# Patient Record
Sex: FEMALE | Race: WHITE | NOT HISPANIC OR LATINO | Employment: UNEMPLOYED | URBAN - METROPOLITAN AREA
[De-identification: names, ages, dates, MRNs, and addresses within clinical notes are randomized per-mention and may not be internally consistent; named-entity substitution may affect disease eponyms.]

---

## 2017-06-28 ENCOUNTER — HOSPITAL ENCOUNTER (EMERGENCY)
Facility: CLINIC | Age: 47
Discharge: HOME | End: 2017-06-28
Attending: PHYSICIAN ASSISTANT | Admitting: PHYSICIAN ASSISTANT

## 2017-06-28 ENCOUNTER — APPOINTMENT (OUTPATIENT)
Dept: CT IMAGING | Facility: CLINIC | Age: 47
End: 2017-06-28
Attending: PHYSICIAN ASSISTANT

## 2017-06-28 VITALS
SYSTOLIC BLOOD PRESSURE: 191 MMHG | OXYGEN SATURATION: 100 % | HEART RATE: 104 BPM | DIASTOLIC BLOOD PRESSURE: 117 MMHG | TEMPERATURE: 97.6 F | RESPIRATION RATE: 22 BRPM

## 2017-06-28 DIAGNOSIS — N30.01 ACUTE CYSTITIS WITH HEMATURIA: ICD-10-CM

## 2017-06-28 LAB
ALBUMIN SERPL-MCNC: 3.4 G/DL (ref 3.4–5)
ALBUMIN UR-MCNC: 100 MG/DL
ALP SERPL-CCNC: 118 U/L (ref 40–150)
ALT SERPL W P-5'-P-CCNC: 25 U/L (ref 0–50)
ANION GAP SERPL CALCULATED.3IONS-SCNC: 6 MMOL/L (ref 3–14)
APPEARANCE UR: ABNORMAL
AST SERPL W P-5'-P-CCNC: 20 U/L (ref 0–45)
BASOPHILS # BLD AUTO: 0 10E9/L (ref 0–0.2)
BASOPHILS NFR BLD AUTO: 0.3 %
BILIRUB SERPL-MCNC: 0.4 MG/DL (ref 0.2–1.3)
BILIRUB UR QL STRIP: NEGATIVE
BUN SERPL-MCNC: 21 MG/DL (ref 7–30)
CALCIUM SERPL-MCNC: 8.8 MG/DL (ref 8.5–10.1)
CHLORIDE SERPL-SCNC: 107 MMOL/L (ref 94–109)
CO2 SERPL-SCNC: 26 MMOL/L (ref 20–32)
COLOR UR AUTO: YELLOW
CREAT SERPL-MCNC: 1.09 MG/DL (ref 0.52–1.04)
DIFFERENTIAL METHOD BLD: ABNORMAL
EOSINOPHIL # BLD AUTO: 0 10E9/L (ref 0–0.7)
EOSINOPHIL NFR BLD AUTO: 0.4 %
ERYTHROCYTE [DISTWIDTH] IN BLOOD BY AUTOMATED COUNT: 14.5 % (ref 10–15)
GFR SERPL CREATININE-BSD FRML MDRD: 54 ML/MIN/1.7M2
GLUCOSE SERPL-MCNC: 89 MG/DL (ref 70–99)
GLUCOSE UR STRIP-MCNC: NEGATIVE MG/DL
HCG UR QL: NEGATIVE
HCT VFR BLD AUTO: 41.3 % (ref 35–47)
HGB BLD-MCNC: 13.2 G/DL (ref 11.7–15.7)
HGB UR QL STRIP: ABNORMAL
IMM GRANULOCYTES # BLD: 0 10E9/L (ref 0–0.4)
IMM GRANULOCYTES NFR BLD: 0.2 %
INR PPP: 1 (ref 0.86–1.14)
KETONES UR STRIP-MCNC: NEGATIVE MG/DL
LEUKOCYTE ESTERASE UR QL STRIP: ABNORMAL
LYMPHOCYTES # BLD AUTO: 1.8 10E9/L (ref 0.8–5.3)
LYMPHOCYTES NFR BLD AUTO: 18.1 %
MCH RBC QN AUTO: 25.7 PG (ref 26.5–33)
MCHC RBC AUTO-ENTMCNC: 32 G/DL (ref 31.5–36.5)
MCV RBC AUTO: 81 FL (ref 78–100)
MONOCYTES # BLD AUTO: 1 10E9/L (ref 0–1.3)
MONOCYTES NFR BLD AUTO: 10.4 %
MUCOUS THREADS #/AREA URNS LPF: PRESENT /LPF
NEUTROPHILS # BLD AUTO: 6.9 10E9/L (ref 1.6–8.3)
NEUTROPHILS NFR BLD AUTO: 70.6 %
NITRATE UR QL: NEGATIVE
PH UR STRIP: 7.5 PH (ref 5–7)
PLATELET # BLD AUTO: 243 10E9/L (ref 150–450)
PLATELET # BLD EST: NORMAL 10*3/UL
POTASSIUM SERPL-SCNC: 4.1 MMOL/L (ref 3.4–5.3)
PROT SERPL-MCNC: 7.6 G/DL (ref 6.8–8.8)
RBC # BLD AUTO: 5.13 10E12/L (ref 3.8–5.2)
RBC #/AREA URNS AUTO: >182 /HPF (ref 0–2)
RBC MORPH BLD: NORMAL
SODIUM SERPL-SCNC: 139 MMOL/L (ref 133–144)
SP GR UR STRIP: 1.01 (ref 1–1.03)
SQUAMOUS #/AREA URNS AUTO: 2 /HPF (ref 0–1)
URN SPEC COLLECT METH UR: ABNORMAL
UROBILINOGEN UR STRIP-MCNC: NORMAL MG/DL (ref 0–2)
VARIANT LYMPHS BLD QL SMEAR: PRESENT
WBC # BLD AUTO: 9.8 10E9/L (ref 4–11)
WBC #/AREA URNS AUTO: 91 /HPF (ref 0–2)

## 2017-06-28 PROCEDURE — 74176 CT ABD & PELVIS W/O CONTRAST: CPT

## 2017-06-28 PROCEDURE — 25000128 H RX IP 250 OP 636: Performed by: PHYSICIAN ASSISTANT

## 2017-06-28 PROCEDURE — 80053 COMPREHEN METABOLIC PANEL: CPT | Performed by: PHYSICIAN ASSISTANT

## 2017-06-28 PROCEDURE — 99284 EMERGENCY DEPT VISIT MOD MDM: CPT | Mod: 25

## 2017-06-28 PROCEDURE — 85025 COMPLETE CBC W/AUTO DIFF WBC: CPT | Performed by: PHYSICIAN ASSISTANT

## 2017-06-28 PROCEDURE — 81025 URINE PREGNANCY TEST: CPT | Performed by: PHYSICIAN ASSISTANT

## 2017-06-28 PROCEDURE — 96375 TX/PRO/DX INJ NEW DRUG ADDON: CPT

## 2017-06-28 PROCEDURE — 25000132 ZZH RX MED GY IP 250 OP 250 PS 637: Performed by: PHYSICIAN ASSISTANT

## 2017-06-28 PROCEDURE — 99284 EMERGENCY DEPT VISIT MOD MDM: CPT | Performed by: PHYSICIAN ASSISTANT

## 2017-06-28 PROCEDURE — 96374 THER/PROPH/DIAG INJ IV PUSH: CPT

## 2017-06-28 PROCEDURE — 81001 URINALYSIS AUTO W/SCOPE: CPT | Performed by: PHYSICIAN ASSISTANT

## 2017-06-28 PROCEDURE — 85610 PROTHROMBIN TIME: CPT | Performed by: PHYSICIAN ASSISTANT

## 2017-06-28 RX ORDER — CIPROFLOXACIN 500 MG/1
500 TABLET, FILM COATED ORAL 2 TIMES DAILY
Qty: 6 TABLET | Refills: 0 | Status: SHIPPED | OUTPATIENT
Start: 2017-06-28 | End: 2017-07-01

## 2017-06-28 RX ORDER — KETOROLAC TROMETHAMINE 30 MG/ML
30 INJECTION, SOLUTION INTRAMUSCULAR; INTRAVENOUS ONCE
Status: COMPLETED | OUTPATIENT
Start: 2017-06-28 | End: 2017-06-28

## 2017-06-28 RX ORDER — PHENAZOPYRIDINE HYDROCHLORIDE 100 MG/1
200 TABLET, FILM COATED ORAL ONCE
Status: COMPLETED | OUTPATIENT
Start: 2017-06-28 | End: 2017-06-28

## 2017-06-28 RX ORDER — ONDANSETRON 2 MG/ML
4 INJECTION INTRAMUSCULAR; INTRAVENOUS ONCE
Status: COMPLETED | OUTPATIENT
Start: 2017-06-28 | End: 2017-06-28

## 2017-06-28 RX ORDER — ASPIRIN 81 MG/1
324 TABLET, CHEWABLE ORAL ONCE
Status: ON HOLD | COMMUNITY
End: 2022-07-28

## 2017-06-28 RX ORDER — PHENAZOPYRIDINE HYDROCHLORIDE 200 MG/1
200 TABLET, FILM COATED ORAL 3 TIMES DAILY PRN
Qty: 6 TABLET | Refills: 0 | Status: SHIPPED | OUTPATIENT
Start: 2017-06-28 | End: 2017-06-30

## 2017-06-28 RX ORDER — ACETAMINOPHEN 325 MG/1
975 TABLET ORAL ONCE
Status: DISCONTINUED | OUTPATIENT
Start: 2017-06-28 | End: 2017-06-28

## 2017-06-28 RX ADMIN — KETOROLAC TROMETHAMINE 30 MG: 30 INJECTION, SOLUTION INTRAMUSCULAR at 17:24

## 2017-06-28 RX ADMIN — ONDANSETRON 4 MG: 2 INJECTION INTRAMUSCULAR; INTRAVENOUS at 17:26

## 2017-06-28 RX ADMIN — PHENAZOPYRIDINE HYDROCHLORIDE 200 MG: 100 TABLET ORAL at 17:28

## 2017-06-28 ASSESSMENT — ENCOUNTER SYMPTOMS
WOUND: 0
DIARRHEA: 0
COUGH: 0
DYSURIA: 1
BLOOD IN STOOL: 0
VOMITING: 0
ABDOMINAL DISTENTION: 1
CONSTIPATION: 0
HEMATURIA: 1
FREQUENCY: 1
CHILLS: 0
NAUSEA: 1
FEVER: 0
SHORTNESS OF BREATH: 0
COLOR CHANGE: 0
ABDOMINAL PAIN: 1

## 2017-06-28 NOTE — ED AVS SNAPSHOT
[facility] Emergency Department    [address] [facility] [address]    Phone:  [phone]    Fax:  [phone]                                       Amparo Bonilla   MRN: [id number]    Department:  [facility] Emergency Department   Date of Visit:  6/28/2017           Patient Information     Date Of Birth          1970        Your diagnoses for this visit were:     Acute cystitis with hematuria        You were seen by Diana Landrum PA-C.      Follow-up Information     Follow up with Carlos Bateman In 3 days.    Specialty:  Family Practice    Why:  if no improvement or sooner if new or worsening symptoms     Contact information:    [facility]  [address] [facility] [address] [phone]        Discharge References/Attachments     BLADDER INFECTION, FEMALE (ADULT) (ENGLISH)      24 Hour Appointment Hotline       To make an appointment at any [facility], call [phone]). If you don't have a family doctor or clinic, we will help you find one. [facility] are conveniently located to serve the needs of you and your family.             Review of your medicines      START taking        Dose / Directions Last dose taken    ciprofloxacin 500 MG tablet   Commonly known as:  CIPRO   Dose:  500 mg   Quantity:  6 tablet        Take 1 tablet (500 mg) by mouth 2 times daily for 3 days   Refills:  0        phenazopyridine 200 MG tablet   Commonly known as:  PYRIDIUM   Dose:  200 mg   Quantity:  6 tablet        Take 1 tablet (200 mg) by mouth 3 times daily as needed for irritation   Refills:  0          Our records show that you are taking the medicines listed below. If these are incorrect, please call your family doctor or clinic.        Dose / Directions Last dose taken    aspirin 81 MG chewable tablet   Dose:  324 mg        Take 324 mg by mouth once   Refills:  0        GABAPENTIN PO   Dose:  300 mg        Take 300 mg by mouth 4 times daily    Refills:  0        IBUPROFEN PO   Dose:  400 mg        Take 400 mg by mouth daily as needed for moderate pain   Refills:  0                Prescriptions were sent or printed at these locations (2 Prescriptions)                   [facility] - [place], [place] - [address]    [address] [facility] [address]    Telephone:  [phone]   Fax:  [phone]   Hours:                  E-Prescribed (2 of 2)         ciprofloxacin (CIPRO) 500 MG tablet               phenazopyridine (PYRIDIUM) 200 MG tablet                Procedures and tests performed during your visit     Abd/pelvis CT - no contrast - Stone Protocol    CBC with platelets, differential    Comprehensive metabolic panel    HCG qualitative urine    INR    UA reflex to Microscopic      Orders Needing Specimen Collection     None      Pending Results     Date and Time Order Name Status Description    6/28/2017 1754 Abd/pelvis CT - no contrast - Stone Protocol Preliminary             Pending Culture Results     No orders found from 6/26/2017 to 6/29/2017.            Pending Results Instructions     If you had any lab results that were not finalized at the time of your Discharge, you can call the ED Lab Result RN at [phone]. You will be contacted by this team for any positive Lab results or changes in treatment. The nurses are available 7 days a week from 10A to 6:30P.  You can leave a message 24 hours per day and they will return your call.        Test Results From Your Hospital Stay        6/28/2017  6:25 PM      Component Results     Component Value Ref Range & Units Status    WBC 9.8 4.0 - 11.0 10e9/L Final    RBC Count 5.13 3.8 - 5.2 10e12/L Final    Hemoglobin 13.2 11.7 - 15.7 g/dL Final    Hematocrit 41.3 35.0 - 47.0 % Final    MCV 81 78 - 100 fl Final    MCH 25.7 (L) 26.5 - 33.0 pg Final    MCHC 32.0 31.5 - 36.5 g/dL Final    RDW 14.5 10.0 - 15.0 % Final    Platelet Count 243 150 - 450 10e9/L Final    Diff Method Automated  Method  Final    % Neutrophils 70.6 % Final    % Lymphocytes 18.1 % Final    % Monocytes 10.4 % Final    % Eosinophils 0.4 % Final    % Basophils 0.3 % Final    % Immature Granulocytes 0.2 % Final    Absolute Neutrophil 6.9 1.6 - 8.3 10e9/L Final    Absolute Lymphocytes 1.8 0.8 - 5.3 10e9/L Final    Absolute Monocytes 1.0 0.0 - 1.3 10e9/L Final    Absolute Eosinophils 0.0 0.0 - 0.7 10e9/L Final    Absolute Basophils 0.0 0.0 - 0.2 10e9/L Final    Abs Immature Granulocytes 0.0 0 - 0.4 10e9/L Final    Reactive Lymphs Present  Final    RBC Morphology Normal  Final    Platelet Estimate Normal  Final         6/28/2017  5:41 PM      Component Results     Component Value Ref Range & Units Status    Sodium 139 133 - 144 mmol/L Final    Potassium 4.1 3.4 - 5.3 mmol/L Final    Chloride 107 94 - 109 mmol/L Final    Carbon Dioxide 26 20 - 32 mmol/L Final    Anion Gap 6 3 - 14 mmol/L Final    Glucose 89 70 - 99 mg/dL Final    Urea Nitrogen 21 7 - 30 mg/dL Final    Creatinine 1.09 (H) 0.52 - 1.04 mg/dL Final    GFR Estimate 54 (L) >60 mL/min/1.7m2 Final    Non  GFR Calc    GFR Estimate If Black 65 >60 mL/min/1.7m2 Final    African American GFR Calc    Calcium 8.8 8.5 - 10.1 mg/dL Final    Bilirubin Total 0.4 0.2 - 1.3 mg/dL Final    Albumin 3.4 3.4 - 5.0 g/dL Final    Protein Total 7.6 6.8 - 8.8 g/dL Final    Alkaline Phosphatase 118 40 - 150 U/L Final    ALT 25 0 - 50 U/L Final    AST 20 0 - 45 U/L Final         6/28/2017  5:23 PM      Component Results     Component Value Ref Range & Units Status    Color Urine Yellow  Final    Appearance Urine Slightly Cloudy  Final    Glucose Urine Negative NEG mg/dL Final    Bilirubin Urine Negative NEG Final    Ketones Urine Negative NEG mg/dL Final    Specific Gravity Urine 1.015 1.003 - 1.035 Final    Blood Urine Moderate (A) NEG Final    pH Urine 7.5 (H) 5.0 - 7.0 pH Final    Protein Albumin Urine 100 (A) NEG mg/dL Final    Urobilinogen mg/dL Normal 0.0 - 2.0 mg/dL Final     Nitrite Urine Negative NEG Final    Leukocyte Esterase Urine Large (A) NEG Final    Source Midstream Urine  Final    RBC Urine >182 (H) 0 - 2 /HPF Final    WBC Urine 91 (H) 0 - 2 /HPF Final    Squamous Epithelial /HPF Urine 2 (H) 0 - 1 /HPF Final    Mucous Urine Present (A) NEG /LPF Final         6/28/2017  6:06 PM      Component Results     Component Value Ref Range & Units Status    HCG Qual Urine Negative NEG Final         6/28/2017  5:32 PM      Component Results     Component Value Ref Range & Units Status    INR 1.00 0.86 - 1.14 Final         6/28/2017  6:23 PM      Narrative     CT ABDOMEN AND PELVIS WITHOUT CONTRAST   6/28/2017 6:09 PM     HISTORY: Generalized abdominal pain, hematuria.    TECHNIQUE: Noncontrast CT abdomen and pelvis was performed. Radiation  dose for this scan was reduced using automated exposure control,  adjustment of the mA and/or kV according to patient size, or iterative  reconstruction technique.    COMPARISON: None.    FINDINGS: No detected urolithiasis or hydronephrosis, but assessment  limited by poor visualization of the ureters. No acute renal  abnormality. Cholecystectomy. Liver, adrenals, spleen, and pancreas  shows no significant abnormalities. Prominent stool seen throughout  the colon. Appendix cannot be visualized for assessment. No free air.  No free fluid detected. Left ovarian prominent cyst suggested  measuring approximately 3 cm image 65.        Impression     IMPRESSION:  1. No hydronephrosis or detected urolithiasis.  2. Left ovarian cyst is 3 cm.  3. Please note that the appendix cannot be visualized for assessment.                Thank you for choosing [facility]       Thank you for choosing [facility] for your care. Our goal is always to provide you with excellent care. Hearing back from our patients is one way we can continue to improve our services. Please take a few minutes to complete the written survey that you may receive in the mail after you visit with us.  "Thank you!        [employer] Information     Travelmenu lets you send messages to your doctor, view your test results, renew your prescriptions, schedule appointments and more. To sign up, go to [email or web address] . Click on \"Log in\" on the left side of the screen, which will take you to the Welcome page. Then click on \"Sign up Now\" on the right side of the page.     You will be asked to enter the access code listed below, as well as some personal information. Please follow the directions to create your username and password.     Your access code is: [email or web address]  Expires: 2017  6:36 PM     Your access code will  in 90 days. If you need help or a new code, please call your [facility] clinic or [phone].        Care EveryWhere ID     This is your Care EveryWhere ID. This could be used by other organizations to access your [facility] medical records  [id number]        Equal Access to Services     GIANCARLO SPANN : Hadii aad ku hadasho Soxander, waaxda luqadaha, qaybta kaalmada adeegyarichi, talia staton . So [facility] [phone].    ATENCIÓN: Si habla español, tiene a grider disposición servicios gratuitos de asistencia lingüística. Llame al [phone].    We comply with applicable federal civil rights laws and [place] laws. We do not discriminate on the basis of race, color, national origin, age, disability sex, sexual orientation or gender identity.            After Visit Summary       This is your record. Keep this with you and show to your community pharmacist(s) and doctor(s) at your next visit.                  "

## 2017-06-28 NOTE — ED PROVIDER NOTES
"  History     Chief Complaint   Patient presents with     Vaginal Bleeding     HPI  Amparo Bonilla is a 46 year old female who presents to the emergency department with past medical history of CAD, MI and STEMI with stenting, seizure disorder, antiphospholipid antibody syndrome with chronic long-term anticoagulant use, hypertension, hyperlipidemia presents to the ED with concerns over generalized abdominal pain, dysuria, increased frequency, urgency, back pain which has been present for the last 3 days.  Patient initially stated that something was sticking out of her vagina causing her pain, however later changed her story stating that the pain was so bad when she peed she felt like she was being stabbed down there.  She states that she feels nausea and bloated.  She denies any actual vomiting.  No recent fever, chills, myalgias, diarrhea, melena, hematochezia.  She has attempted to treat with ibuprofen 600 mg and aspirin without significant relief.  She has had a history of kidney stones per her report.  Patient is supposed to be on long-term Coumadin use secondary to her antiphospholipid antibody syndrome however states that she discontinued approximately 3 weeks ago due to lack of insurance.    I have reviewed the Medications, Allergies, Past Medical and Surgical History, and Social History in the Epic system.    Allergies:   Allergies   Allergen Reactions     Nucynta Er [Tapentadol Hcl] Other (See Comments)     Chest pain, light headed and dizzy     Aspirin Nausea and Vomiting     Pt states she is \"allergic to aspirin, but not baby aspirin (81 mg)\"     Cyclobenzaprine Other (See Comments)     Restless legs     Imitrex [Sumatriptan Succinate] Palpitations     Lisinopril Other (See Comments) and Rash     headache       No current facility-administered medications on file prior to encounter.   Current Outpatient Prescriptions on File Prior to Encounter:  isosorbide mononitrate (IMDUR) 60 MG 24 hr tablet Take 1 " tablet (60 mg) by mouth daily   warfarin (COUMADIN) 4 MG tablet Take 2.5 tablets (10 mg) by mouth daily   clopidogrel (PLAVIX) 75 MG tablet Take 1 tablet (75 mg) by mouth daily   HYDRALAZINE HCL PO Take 200 mg by mouth 2 times daily   GABAPENTIN PO Take 300 mg by mouth 3 times daily   carvedilol (COREG) 3.125 MG tablet Take 2 tablets (6.25 mg) by mouth 2 times daily (with meals)   atorvastatin (LIPITOR) 80 MG tablet Take 1 tablet by mouth At Bedtime. (Patient taking differently: Take 80 mg by mouth every morning )   amLODIPine (NORVASC) 10 MG tablet Take 1 tablet by mouth daily.   nitroglycerin (NITROSTAT) 0.4 MG SL tablet Place 1 tablet under the tongue every 5 minutes as needed for chest pain.   losartan (COZAAR) 100 MG tablet Take 1 tablet by mouth daily. (Patient taking differently: Take 50 mg by mouth daily )   hydrochlorothiazide (HYDRODIURIL) 25 MG tablet Take 1 tablet by mouth daily.   pantoprazole (PROTONIX) 40 MG enteric coated tablet Take 1 tablet by mouth 2 times daily (before meals).     Patient Active Problem List   Diagnosis     CAD (coronary artery disease)     Hypertension goal BP (blood pressure) < 140/90     Hyperlipidemia LDL goal <70     Syncope     Seizure disorder (H)     Candidal esophagitis (H)     Antiphospholipid antibody syndrome (H)     Myocarditis (H)     ACS (acute coronary syndrome) (H)     Chest pain     Long term current use of anticoagulant therapy     Syncope and collapse     Health Care Home     NSTEMI (non-ST elevated myocardial infarction) (H)     Past Surgical History:   Procedure Laterality Date     bilateral tubal ligat  1994     coronary stent  03/2013     ESOPHAGOSCOPY, GASTROSCOPY, DUODENOSCOPY (EGD), COMBINED  6/5/2013    Procedure: COMBINED ESOPHAGOSCOPY, GASTROSCOPY, DUODENOSCOPY (EGD);  Brushing to esophagus with cytology brush;  Surgeon: Hector Matute MD;  Location:  GI     LAPAROSCOPIC CHOLECYSTECTOMY  12/2012     SURGICAL HISTORY OF -   age 10    PE tubes  "    Social History   Substance Use Topics     Smoking status: Current Every Day Smoker     Packs/day: 0.25     Years: 20.00     Smokeless tobacco: Not on file     Alcohol use No     Most Recent Immunizations   Administered Date(s) Administered     Influenza (IIV3) 10/31/2012     Influenza Vaccine IM 3yrs+ 4 Valent IIV4 10/25/2013     BMI: Estimated body mass index is 32.91 kg/(m^2) as calculated from the following:    Height as of 11/5/15: 1.575 m (5' 2.01\").    Weight as of 11/5/15: 81.6 kg (180 lb).    Review of Systems   Constitutional: Negative for chills and fever.   Respiratory: Negative for cough and shortness of breath.    Cardiovascular: Negative for chest pain.   Gastrointestinal: Positive for abdominal distention, abdominal pain and nausea. Negative for blood in stool, constipation, diarrhea and vomiting.   Genitourinary: Positive for dysuria, frequency, hematuria, urgency, vaginal bleeding (unsure), vaginal discharge and vaginal pain.   Skin: Negative for color change, rash and wound.     Physical Exam   BP (!) 191/117  Pulse 104  Temp 97.6  F (36.4  C) (Temporal)  Resp 22  SpO2 100%  Physical Exam   Constitutional: She appears well-developed and well-nourished. She appears distressed (patient appears in pain).   HENT:   Head: Normocephalic and atraumatic.   Cardiovascular: Normal rate, regular rhythm and normal heart sounds.  Exam reveals no gallop and no friction rub.    No murmur heard.  Pulmonary/Chest: Effort normal and breath sounds normal. No respiratory distress. She has no wheezes. She has no rales.   Abdominal: Soft. She exhibits no distension. There is tenderness (mild suprapubic). There is no rebound and no guarding.   Genitourinary:   Genitourinary Comments: Deferred by patient   Skin: Skin is warm and dry. No rash noted. No erythema.     ED Course     ED Course     Procedures            Critical Care time:  none            Results for orders placed or performed during the hospital " encounter of 06/28/17   Abd/pelvis CT - no contrast - Stone Protocol    Narrative    CT ABDOMEN AND PELVIS WITHOUT CONTRAST   6/28/2017 6:09 PM     HISTORY: Generalized abdominal pain, hematuria.    TECHNIQUE: Noncontrast CT abdomen and pelvis was performed. Radiation  dose for this scan was reduced using automated exposure control,  adjustment of the mA and/or kV according to patient size, or iterative  reconstruction technique.    COMPARISON: None.    FINDINGS: No detected urolithiasis or hydronephrosis, but assessment  limited by poor visualization of the ureters. No acute renal  abnormality. Cholecystectomy. Liver, adrenals, spleen, and pancreas  shows no significant abnormalities. Prominent stool seen throughout  the colon. Appendix cannot be visualized for assessment. No free air.  No free fluid detected. Left ovarian prominent cyst suggested  measuring approximately 3 cm image 65.      Impression    IMPRESSION:  1. No hydronephrosis or detected urolithiasis.  2. Left ovarian cyst is 3 cm.  3. Please note that the appendix cannot be visualized for assessment.     Labs Ordered and Resulted from Time of ED Arrival Up to the Time of Departure from the ED   CBC WITH PLATELETS DIFFERENTIAL - Abnormal; Notable for the following:        Result Value    MCH 25.7 (*)     All other components within normal limits   COMPREHENSIVE METABOLIC PANEL - Abnormal; Notable for the following:     Creatinine 1.09 (*)     GFR Estimate 54 (*)     All other components within normal limits   URINE MACROSCOPIC WITH REFLEX TO MICRO - Abnormal; Notable for the following:     Blood Urine Moderate (*)     pH Urine 7.5 (*)     Protein Albumin Urine 100 (*)     Leukocyte Esterase Urine Large (*)     RBC Urine >182 (*)     WBC Urine 91 (*)     Squamous Epithelial /HPF Urine 2 (*)     Mucous Urine Present (*)     All other components within normal limits   HCG QUALITATIVE URINE   INR     Assessments & Plan (with Medical Decision Making)     I  have reviewed the nursing notes.    I have reviewed the findings, diagnosis, plan and need for follow up with the patient.       New Prescriptions    CIPROFLOXACIN (CIPRO) 500 MG TABLET    Take 1 tablet (500 mg) by mouth 2 times daily for 3 days    PHENAZOPYRIDINE (PYRIDIUM) 200 MG TABLET    Take 1 tablet (200 mg) by mouth 3 times daily as needed for irritation     Final diagnoses:   Acute cystitis with hematuria     46-year-old female with past medical history significant for CAD, MI and STEMI with stenting, seizure disorder, antiphospholipid antibody syndrome with chronic long-term anticoagulant use however she is not currently compliant with medications due to cost, hypertension, hyperlipidemia who presents to the emergency department with concerns over a 3 day history of  generalized abdominal pain, dysuria, increased frequency, urgency, back pain which has been present for the last 3 days.  Patient was initially hypertensive, tachycardic upon arrival, however heart rate had improved upon time of examination.  She appeared to be in mild distress due to discomfort however her behavior was significantly out of proportion with physical exam and lab findings.  Part of the evaluation she did have laboratory testing including CBC, CMP which showed mildly elevated creatinine. Urinalysis was positive for infection with large number of RBC present.  Given patient's past stated history of kidney stones I did elect to obtained a CT of her abdomen and pelvis which was negative for hydronephrosis, urolithiasis.  There was incidental finding of 3 cm left ovarian cyst which is of unclear significance for her current pain.  There was no free air.  Patient was informed of CT findings.  She is discharged home stable with a prescription for Cipro and Pyridium.  She was instructed to follow up with her primary care provider to reestablish coumadin therapy.  Follow up if no improvement of urinary symptoms in 48-72 hours.  Worrisome  reasons to return to ER/UC sooner discussed.      Disclaimer: This note consists of symbols derived from keyboarding, dictation, and/or voice recognition software. As a result, there may be errors in the script that have gone undetected.  Please consider this when interpreting information found in the chart.    6/28/2017   [facility] EMERGENCY DEPARTMENT     Diana Landrum PA-C  06/28/17 2498

## 2017-06-28 NOTE — ED AVS SNAPSHOT
[facility] Emergency Department    [address] [facility] [address]    Phone:  [phone]    Fax:  [phone]                                       Amparo Bonilla   MRN: [id number]    Department:  [facility] Emergency Department   Date of Visit:  6/28/2017           After Visit Summary Signature Page     I have received my discharge instructions, and my questions have been answered. I have discussed any challenges I see with this plan with the nurse or doctor.    ..........................................................................................................................................  Patient/Patient Representative Signature      ..........................................................................................................................................  Patient Representative Print Name and Relationship to Patient    ..................................................               ................................................  Date                                            Time    ..........................................................................................................................................  Reviewed by Signature/Title    ...................................................              ..............................................  Date                                                            Time

## 2017-06-29 ENCOUNTER — NURSE TRIAGE (OUTPATIENT)
Dept: NURSING | Facility: CLINIC | Age: 47
End: 2017-06-29

## 2017-06-30 NOTE — TELEPHONE ENCOUNTER
"Amparo diagnosed with UTI and started on Cipro 5/28/17.  On abx almost 24 hours, pain worsening.  \"It feels like a knife and I can take pain\".  Is drinking fluids, pure cranberry and is unable to get off of toilet due to frequency.      Reason for Disposition    [1] SEVERE pain (e.g., excruciating) AND [2] no improvement 2 hours after pain medications    Additional Information    Negative: Shock suspected (e.g., cold/pale/clammy skin, too weak to stand, low BP, rapid pulse)    Negative: Sounds like a life-threatening emergency to the triager    Negative: Urinary tract infection suspected, but not taking antibiotics    Negative: [1] Unable to urinate (or only a few drops) > 4 hours AND     [2] bladder feels very full (e.g., palpable bladder or strong urge to urinate)    Negative: Passing pure blood or large blood clots (i.e., size > a dime)  (Exceptions: flecks, small strands, or pinkish-red color)    Negative: Patient sounds very sick or weak to the triager    Protocols used: URINARY TRACT INFECTION ON ANTIBIOTIC FOLLOW-UP CALL - FEMALE-ADULT-    "

## 2017-07-01 ENCOUNTER — HOSPITAL ENCOUNTER (EMERGENCY)
Facility: CLINIC | Age: 47
Discharge: HOME | End: 2017-07-01
Attending: EMERGENCY MEDICINE | Admitting: EMERGENCY MEDICINE

## 2017-07-01 VITALS
OXYGEN SATURATION: 100 % | SYSTOLIC BLOOD PRESSURE: 146 MMHG | DIASTOLIC BLOOD PRESSURE: 100 MMHG | TEMPERATURE: 98.3 F | RESPIRATION RATE: 16 BRPM | HEART RATE: 118 BPM

## 2017-07-01 DIAGNOSIS — N30.01 ACUTE CYSTITIS WITH HEMATURIA: ICD-10-CM

## 2017-07-01 LAB
ALBUMIN UR-MCNC: 100 MG/DL
APPEARANCE UR: ABNORMAL
BILIRUB UR QL STRIP: NEGATIVE
COLOR UR AUTO: ABNORMAL
GLUCOSE UR STRIP-MCNC: NEGATIVE MG/DL
HGB UR QL STRIP: ABNORMAL
KETONES UR STRIP-MCNC: NEGATIVE MG/DL
LEUKOCYTE ESTERASE UR QL STRIP: ABNORMAL
MUCOUS THREADS #/AREA URNS LPF: PRESENT /LPF
NITRATE UR QL: NEGATIVE
PH UR STRIP: 7 PH (ref 5–7)
RBC #/AREA URNS AUTO: >182 /HPF (ref 0–2)
SP GR UR STRIP: 1.01 (ref 1–1.03)
SQUAMOUS #/AREA URNS AUTO: 5 /HPF (ref 0–1)
TRANS CELLS #/AREA URNS HPF: 5 /HPF (ref 0–1)
URN SPEC COLLECT METH UR: ABNORMAL
UROBILINOGEN UR STRIP-MCNC: NORMAL MG/DL (ref 0–2)
WBC #/AREA URNS AUTO: >182 /HPF (ref 0–2)

## 2017-07-01 PROCEDURE — 81001 URINALYSIS AUTO W/SCOPE: CPT | Performed by: EMERGENCY MEDICINE

## 2017-07-01 PROCEDURE — 99284 EMERGENCY DEPT VISIT MOD MDM: CPT | Performed by: EMERGENCY MEDICINE

## 2017-07-01 PROCEDURE — 99283 EMERGENCY DEPT VISIT LOW MDM: CPT

## 2017-07-01 PROCEDURE — 87086 URINE CULTURE/COLONY COUNT: CPT | Performed by: EMERGENCY MEDICINE

## 2017-07-01 RX ORDER — SULFAMETHOXAZOLE/TRIMETHOPRIM 800-160 MG
1 TABLET ORAL 2 TIMES DAILY
Qty: 14 TABLET | Refills: 0 | Status: SHIPPED | OUTPATIENT
Start: 2017-07-01 | End: 2017-07-08

## 2017-07-01 NOTE — DISCHARGE INSTRUCTIONS
Understanding Urinary Tract Infections (UTIs)  Most UTIs are caused by bacteria, although they may also be caused by viruses or fungi. Bacteria from the bowel are the most common source of infection. The infection may start because of any of the following:    Sexual activity. During sex, bacteria can travel from the penis, vagina, or rectum into the urethra.     Bacteria on the skin outside the rectum may travel into the urethra. This is more common in women since the rectum and urethra are closer to each other than in men. Wiping from front to back after using the toilet and keeping the area clean can help prevent germs from getting to the urethra.    Blockage of urine flow through the urinary tract. If urine sits too long, germs may start to grow out of control.      Parts of the urinary tract  The infection can occur in any part of the urinary tract.    The kidneys collect and store urine.    The ureters carry urine from the kidneys to the bladder.    The bladder holds urine until you are ready to let it out.    The urethra carries urine from the bladder out of the body. It is shorter in women, so bacteria can move through it more easily. The urethra is longer in men, so a UTI is less likely to reach the bladder or kidneys in men.  Date Last Reviewed: 1/1/2017 2000-2017 [employer]. [address] [place] [address], [place] [address]. All rights reserved. This information is not intended as a substitute for professional medical care. Always follow your healthcare professional's instructions.

## 2017-07-01 NOTE — ED NOTES
SEEN HERE 6/28 PT STATES AND DX WITH UTI - STATES THAT SHE HAS HAD ABD  AND BACK PAIN,HA,BODY ACHES AND HEMATURIA X 1 WEEK

## 2017-07-01 NOTE — ED AVS SNAPSHOT
[facility] Emergency Department    [address] [address]    Phone:  [phone]    Fax:  [phone]                                       Amparo Bonilla   MRN: [id number]    Department:  [facility] Emergency Department   Date of Visit:  7/1/2017           Patient Information     Date Of Birth          1970        Your diagnoses for this visit were:     Acute cystitis with hematuria        You were seen by Ramon Rey MD.      Follow-up Information     Follow up with Carlos Bateman.    Specialty:  Family Practice    Contact information:    [facility]  [address] [facility] [id number] [phone]          Discharge Instructions         Understanding Urinary Tract Infections (UTIs)  Most UTIs are caused by bacteria, although they may also be caused by viruses or fungi. Bacteria from the bowel are the most common source of infection. The infection may start because of any of the following:    Sexual activity. During sex, bacteria can travel from the penis, vagina, or rectum into the urethra.     Bacteria on the skin outside the rectum may travel into the urethra. This is more common in women since the rectum and urethra are closer to each other than in men. Wiping from front to back after using the toilet and keeping the area clean can help prevent germs from getting to the urethra.    Blockage of urine flow through the urinary tract. If urine sits too long, germs may start to grow out of control.      Parts of the urinary tract  The infection can occur in any part of the urinary tract.    The kidneys collect and store urine.    The ureters carry urine from the kidneys to the bladder.    The bladder holds urine until you are ready to let it out.    The urethra carries urine from the bladder out of the body. It is shorter in women, so bacteria can move through it more easily. The urethra is longer in men, so a UTI is less likely to reach the  bladder or kidneys in men.  Date Last Reviewed: 1/1/2017 2000-2017 [employer]. [address] [place] [address], [place] [address]. All rights reserved. This information is not intended as a substitute for professional medical care. Always follow your healthcare professional's instructions.          24 Hour Appointment Hotline       To make an appointment at any [facility], call [phone] ([phone]). If you don't have a family doctor or clinic, we will help you find one. [place] clinics are conveniently located to serve the needs of you and your family.             Review of your medicines      START taking        Dose / Directions Last dose taken    sulfamethoxazole-trimethoprim 800-160 MG per tablet   Commonly known as:  BACTRIM DS   Dose:  1 tablet   Quantity:  14 tablet        Take 1 tablet by mouth 2 times daily for 7 days   Refills:  0          Our records show that you are taking the medicines listed below. If these are incorrect, please call your family doctor or clinic.        Dose / Directions Last dose taken    aspirin 81 MG chewable tablet   Dose:  324 mg        Take 324 mg by mouth once   Refills:  0        ciprofloxacin 500 MG tablet   Commonly known as:  CIPRO   Dose:  500 mg   Quantity:  6 tablet        Take 1 tablet (500 mg) by mouth 2 times daily for 3 days   Refills:  0        GABAPENTIN PO   Dose:  300 mg        Take 300 mg by mouth 4 times daily   Refills:  0        IBUPROFEN PO   Dose:  600 mg        Take 600 mg by mouth daily as needed for moderate pain   Refills:  0          ASK your doctor about these medications        Dose / Directions Last dose taken    phenazopyridine 200 MG tablet   Commonly known as:  PYRIDIUM   Dose:  200 mg   Quantity:  6 tablet   Ask about: Should I take this medication?        Take 1 tablet (200 mg) by mouth 3 times daily as needed for irritation   Refills:  0                Prescriptions were sent or printed at these locations (1  Prescription)                   Other Prescriptions                Printed at Department/Unit printer (1 of 1)         sulfamethoxazole-trimethoprim (BACTRIM DS) 800-160 MG per tablet                Procedures and tests performed during your visit     UA reflex to Microscopic      Orders Needing Specimen Collection     None      Pending Results     No orders found from 6/29/2017 to 7/2/2017.            Pending Culture Results     No orders found from 6/29/2017 to 7/2/2017.            Pending Results Instructions     If you had any lab results that were not finalized at the time of your Discharge, you can call the ED Lab Result RN at [phone]. You will be contacted by this team for any positive Lab results or changes in treatment. The nurses are available 7 days a week from 10A to 6:30P.  You can leave a message 24 hours per day and they will return your call.        Test Results From Your Hospital Stay        7/1/2017  2:40 PM      Component Results     Component Value Ref Range & Units Status    Color Urine Dark Yellow  Final    Appearance Urine Slightly Cloudy  Final    Glucose Urine Negative NEG mg/dL Final    Bilirubin Urine Negative NEG Final    Ketones Urine Negative NEG mg/dL Final    Specific Gravity Urine 1.015 1.003 - 1.035 Final    Blood Urine Moderate (A) NEG Final    pH Urine 7.0 5.0 - 7.0 pH Final    Protein Albumin Urine 100 (A) NEG mg/dL Final    Urobilinogen mg/dL Normal 0.0 - 2.0 mg/dL Final    Nitrite Urine Negative NEG Final    Leukocyte Esterase Urine Large (A) NEG Final    Source Midstream Urine  Final    RBC Urine >182 (H) 0 - 2 /HPF Final    WBC Urine >182 (H) 0 - 2 /HPF Final    Squamous Epithelial /HPF Urine 5 (H) 0 - 1 /HPF Final    Transitional Epi 5 (H) 0 - 1 /HPF Final    Mucous Urine Present (A) NEG /LPF Final                Thank you for choosing [facility]       Thank you for choosing [facility] for your care. Our goal is always to provide you with excellent care. Hearing back from  "our patients is one way we can continue to improve our services. Please take a few minutes to complete the written survey that you may receive in the mail after you visit with us. Thank you!        [employer] Information     [employer] lets you send messages to your doctor, view your test results, renew your prescriptions, schedule appointments and more. To sign up, go to [email or web address] . Click on \"Log in\" on the left side of the screen, which will take you to the Welcome page. Then click on \"Sign up Now\" on the right side of the page.     You will be asked to enter the access code listed below, as well as some personal information. Please follow the directions to create your username and password.     Your access code is: [email or web address]  Expires: 2017  6:36 PM     Your access code will  in 90 days. If you need help or a new code, please call your [place] clinic or [phone].        Care EveryWhere ID     This is your Care EveryWhere ID. This could be used by other organizations to access your [facility] medical records  [id number]        Equal Access to Services     DARCI SPANN : Hadmiko Craig, chelsie tee, yancy hung, talia staton . So [facility] [phone].    ATENCIÓN: Si habla español, tiene a grider disposición servicios gratuitos de asistencia lingüística. Sourav al [phone].    We comply with applicable federal civil rights laws and [place] laws. We do not discriminate on the basis of race, color, national origin, age, disability sex, sexual orientation or gender identity.            After Visit Summary       This is your record. Keep this with you and show to your community pharmacist(s) and doctor(s) at your next visit.                  "

## 2017-07-01 NOTE — ED AVS SNAPSHOT
[facility] Emergency Department    [address] [address]    Phone:  [phone]    Fax:  [phone]                                       Amparo Bonilla   MRN: [id number]    Department:  [facility] Emergency Department   Date of Visit:  7/1/2017           After Visit Summary Signature Page     I have received my discharge instructions, and my questions have been answered. I have discussed any challenges I see with this plan with the nurse or doctor.    ..........................................................................................................................................  Patient/Patient Representative Signature      ..........................................................................................................................................  Patient Representative Print Name and Relationship to Patient    ..................................................               ................................................  Date                                            Time    ..........................................................................................................................................  Reviewed by Signature/Title    ...................................................              ..............................................  Date                                                            Time

## 2017-07-01 NOTE — ED PROVIDER NOTES
"Chief complaint dysuria hematuria    46-year-old female presents the above symptoms for the past 6 days.  She was seen here on 6/28, CT scan abdomen pelvis without contrast no abnormalities appreciated, UA micro-greater nontender knee 2 red cells, 91 white cells, no bacteria, no culture accomplished.  Remainder workup was unremarkable with respect to labs.  Patient treated with 500 mg Cipro twice a day ×3 days.  Finish the course, just induced to have dysuria, frequency and hematuria.  Describes feeling hot and cold, no objective fever.  Denies nausea vomiting or diarrhea.  Describes pain that radiates into her low back and \"all over\".    History of antiphospholipid antibody syndrome, not currently on Coumadin, coronary artery disease, hypertension, hyperlipidemia.    Past medical history, medications, allergies, social history, family history all reviewed.  Patient Active Problem List   Diagnosis     CAD (coronary artery disease)     Hypertension goal BP (blood pressure) < 140/90     Hyperlipidemia LDL goal <70     Syncope     Seizure disorder (H)     Candidal esophagitis (H)     Antiphospholipid antibody syndrome (H)     Myocarditis (H)     ACS (acute coronary syndrome) (H)     Chest pain     Long term current use of anticoagulant therapy     Syncope and collapse     Health Care Home     NSTEMI (non-ST elevated myocardial infarction) (H)     ROS: All other systems reviewed and are negative.    /80  Pulse 118  Temp 98.3  F (36.8  C) (Oral)  Resp 18  SpO2 99%  Nontoxic appearing no respiratory distress alert and oriented ×3  Head atraumatic normocephalic  Conjunctiva noninjected  Neck supple full active painless range of motion  Lungs clear to auscultation  Heart regular no murmur  Abdomen soft mild suprapubic tenderness without guarding or rebound, nondistended bowel sounds positive no masses or HSM  Strength and sensation grossly intact throughout the extremities, gait and station normal  Speech is " fluent, good eye contact, thought processes are rational    Results for orders placed or performed during the hospital encounter of 07/01/17   UA reflex to Microscopic   Result Value Ref Range    Color Urine Dark Yellow     Appearance Urine Slightly Cloudy     Glucose Urine Negative NEG mg/dL    Bilirubin Urine Negative NEG    Ketones Urine Negative NEG mg/dL    Specific Gravity Urine 1.015 1.003 - 1.035    Blood Urine Moderate (A) NEG    pH Urine 7.0 5.0 - 7.0 pH    Protein Albumin Urine 100 (A) NEG mg/dL    Urobilinogen mg/dL Normal 0.0 - 2.0 mg/dL    Nitrite Urine Negative NEG    Leukocyte Esterase Urine Large (A) NEG    Source Midstream Urine     RBC Urine >182 (H) 0 - 2 /HPF    WBC Urine >182 (H) 0 - 2 /HPF    Squamous Epithelial /HPF Urine 5 (H) 0 - 1 /HPF    Transitional Epi 5 (H) 0 - 1 /HPF    Mucous Urine Present (A) NEG /LPF     MDM: 46-year-old female with complex past medical history outlined above presents with persistent dysuria, urinary frequency and hematuria.  She takes Effient on a regular basis, she is not currently taking Coumadin.  Finished recent course of ciprofloxacin, persistently abnormal urine, no culture done on recent visits.  Repeat urine today as above white cells or red cells, no bacteria reported, urine culture ordered and pending.  Switch antibiotic to Bactrim.  Await culture results.  Tylenol or ibuprofen for discomfort.  Return criteria reviewed.    Impression: Hemorrhagic cystitis     Ramon Rey MD  07/01/17 5349

## 2017-07-03 LAB
BACTERIA SPEC CULT: NORMAL
MICRO REPORT STATUS: NORMAL
SPECIMEN SOURCE: NORMAL

## 2019-09-13 ENCOUNTER — TELEPHONE (OUTPATIENT)
Dept: NURSING | Facility: CLINIC | Age: 49
End: 2019-09-13

## 2019-09-13 NOTE — TELEPHONE ENCOUNTER
.  [facility]: Nurse Triage Note  SITUATION/BACKGROUND                                                      Amparo Bonilla is a 48 year old female with hx of HTN calls to schedule an appointment. Residing at [facility] x 3 days.  Patient stated that staff took her BP this morning with 147/27 result. Having numbness and tingling, headache and dizziness at this time.     Nurse expressed the need to seek ED now.     RECOMMENDATION/PLAN                                                      RECOMMENDED DISPOSITION: Per protocol, Call 911 - Seek ED care now.   Will comply with recommendation: Yes    If further questions/concerns or if symptoms do not improve, worsen or new symptoms develop, call your PCP or [phone] to talk with the Resident on call, as soon as possible.    Guideline used: hypertension  Telephone Triage Protocols for Nurses, Fifth Edition, Deborah Banks, RN, RN

## 2021-05-29 ENCOUNTER — HEALTH MAINTENANCE LETTER (OUTPATIENT)
Age: 51
End: 2021-05-29

## 2021-09-18 ENCOUNTER — HEALTH MAINTENANCE LETTER (OUTPATIENT)
Age: 51
End: 2021-09-18

## 2022-06-19 ENCOUNTER — HEALTH MAINTENANCE LETTER (OUTPATIENT)
Age: 52
End: 2022-06-19

## 2022-07-25 ENCOUNTER — APPOINTMENT (OUTPATIENT)
Dept: CT IMAGING | Facility: CLINIC | Age: 52
DRG: 281 | End: 2022-07-25
Attending: EMERGENCY MEDICINE

## 2022-07-25 ENCOUNTER — HOSPITAL ENCOUNTER (INPATIENT)
Facility: CLINIC | Age: 52
LOS: 3 days | Discharge: HOME | DRG: 281 | End: 2022-07-28
Attending: EMERGENCY MEDICINE | Admitting: INTERNAL MEDICINE

## 2022-07-25 ENCOUNTER — APPOINTMENT (OUTPATIENT)
Dept: GENERAL RADIOLOGY | Facility: CLINIC | Age: 52
DRG: 281 | End: 2022-07-25
Attending: EMERGENCY MEDICINE

## 2022-07-25 DIAGNOSIS — I16.1 HYPERTENSIVE EMERGENCY: ICD-10-CM

## 2022-07-25 DIAGNOSIS — D68.61 ANTIPHOSPHOLIPID ANTIBODY SYNDROME (H): Primary | ICD-10-CM

## 2022-07-25 DIAGNOSIS — Z11.52 ENCOUNTER FOR SCREENING LABORATORY TESTING FOR SEVERE ACUTE RESPIRATORY SYNDROME CORONAVIRUS 2 (SARS-COV-2): ICD-10-CM

## 2022-07-25 DIAGNOSIS — I21.4 NSTEMI (NON-ST ELEVATED MYOCARDIAL INFARCTION) (H): ICD-10-CM

## 2022-07-25 LAB
ALBUMIN SERPL BCG-MCNC: 4.2 G/DL (ref 3.5–5.2)
ALP SERPL-CCNC: 140 U/L (ref 35–104)
ALT SERPL W P-5'-P-CCNC: 22 U/L (ref 10–35)
ANION GAP SERPL CALCULATED.3IONS-SCNC: 14 MMOL/L (ref 7–15)
AST SERPL W P-5'-P-CCNC: 58 U/L (ref 10–35)
ATRIAL RATE - MUSE: 96 BPM
BASOPHILS # BLD AUTO: 0.1 10E3/UL (ref 0–0.2)
BASOPHILS NFR BLD AUTO: 1 %
BILIRUB SERPL-MCNC: 0.3 MG/DL
BUN SERPL-MCNC: 26 MG/DL (ref 6–20)
CALCIUM SERPL-MCNC: 9.4 MG/DL (ref 8.6–10)
CHLORIDE SERPL-SCNC: 100 MMOL/L (ref 98–107)
CREAT SERPL-MCNC: 1.4 MG/DL (ref 0.51–0.95)
DEPRECATED HCO3 PLAS-SCNC: 25 MMOL/L (ref 22–29)
DIASTOLIC BLOOD PRESSURE - MUSE: NORMAL MMHG
EOSINOPHIL # BLD AUTO: 0.2 10E3/UL (ref 0–0.7)
EOSINOPHIL NFR BLD AUTO: 3 %
ERYTHROCYTE [DISTWIDTH] IN BLOOD BY AUTOMATED COUNT: 13.7 % (ref 10–15)
GFR SERPL CREATININE-BSD FRML MDRD: 46 ML/MIN/1.73M2
GLUCOSE SERPL-MCNC: 92 MG/DL (ref 70–99)
HCT VFR BLD AUTO: 39.8 % (ref 35–47)
HGB BLD-MCNC: 12.7 G/DL (ref 11.7–15.7)
HOLD SPECIMEN: NORMAL
IMM GRANULOCYTES # BLD: 0 10E3/UL
IMM GRANULOCYTES NFR BLD: 0 %
INTERPRETATION ECG - MUSE: NORMAL
LYMPHOCYTES # BLD AUTO: 2 10E3/UL (ref 0.8–5.3)
LYMPHOCYTES NFR BLD AUTO: 25 %
MCH RBC QN AUTO: 25.5 PG (ref 26.5–33)
MCHC RBC AUTO-ENTMCNC: 31.9 G/DL (ref 31.5–36.5)
MCV RBC AUTO: 80 FL (ref 78–100)
MONOCYTES # BLD AUTO: 0.7 10E3/UL (ref 0–1.3)
MONOCYTES NFR BLD AUTO: 9 %
NEUTROPHILS # BLD AUTO: 4.9 10E3/UL (ref 1.6–8.3)
NEUTROPHILS NFR BLD AUTO: 62 %
NRBC # BLD AUTO: 0 10E3/UL
NRBC BLD AUTO-RTO: 0 /100
NT-PROBNP SERPL-MCNC: 2906 PG/ML (ref 0–900)
P AXIS - MUSE: 57 DEGREES
PLATELET # BLD AUTO: 291 10E3/UL (ref 150–450)
POTASSIUM SERPL-SCNC: 3.2 MMOL/L (ref 3.4–5.3)
PR INTERVAL - MUSE: 138 MS
PROT SERPL-MCNC: 8 G/DL (ref 6.4–8.3)
QRS DURATION - MUSE: 92 MS
QT - MUSE: 394 MS
QTC - MUSE: 497 MS
R AXIS - MUSE: 37 DEGREES
RBC # BLD AUTO: 4.99 10E6/UL (ref 3.8–5.2)
SODIUM SERPL-SCNC: 139 MMOL/L (ref 136–145)
SYSTOLIC BLOOD PRESSURE - MUSE: NORMAL MMHG
T AXIS - MUSE: 60 DEGREES
TROPONIN T SERPL HS-MCNC: 1117 NG/L
VENTRICULAR RATE- MUSE: 96 BPM
WBC # BLD AUTO: 7.9 10E3/UL (ref 4–11)

## 2022-07-25 PROCEDURE — 96366 THER/PROPH/DIAG IV INF ADDON: CPT

## 2022-07-25 PROCEDURE — 96367 TX/PROPH/DG ADDL SEQ IV INF: CPT

## 2022-07-25 PROCEDURE — 36415 COLL VENOUS BLD VENIPUNCTURE: CPT | Performed by: EMERGENCY MEDICINE

## 2022-07-25 PROCEDURE — 93010 ELECTROCARDIOGRAM REPORT: CPT | Performed by: EMERGENCY MEDICINE

## 2022-07-25 PROCEDURE — 250N000013 HC RX MED GY IP 250 OP 250 PS 637: Performed by: EMERGENCY MEDICINE

## 2022-07-25 PROCEDURE — 96365 THER/PROPH/DIAG IV INF INIT: CPT

## 2022-07-25 PROCEDURE — 70450 CT HEAD/BRAIN W/O DYE: CPT | Mod: 26 | Performed by: RADIOLOGY

## 2022-07-25 PROCEDURE — 96376 TX/PRO/DX INJ SAME DRUG ADON: CPT

## 2022-07-25 PROCEDURE — 250N000011 HC RX IP 250 OP 636: Performed by: EMERGENCY MEDICINE

## 2022-07-25 PROCEDURE — 93005 ELECTROCARDIOGRAM TRACING: CPT

## 2022-07-25 PROCEDURE — 82310 ASSAY OF CALCIUM: CPT | Performed by: EMERGENCY MEDICINE

## 2022-07-25 PROCEDURE — 85014 HEMATOCRIT: CPT | Performed by: EMERGENCY MEDICINE

## 2022-07-25 PROCEDURE — 71045 X-RAY EXAM CHEST 1 VIEW: CPT

## 2022-07-25 PROCEDURE — 99285 EMERGENCY DEPT VISIT HI MDM: CPT | Mod: 25

## 2022-07-25 PROCEDURE — 96375 TX/PRO/DX INJ NEW DRUG ADDON: CPT

## 2022-07-25 PROCEDURE — 99291 CRITICAL CARE FIRST HOUR: CPT | Mod: 25 | Performed by: EMERGENCY MEDICINE

## 2022-07-25 PROCEDURE — 84484 ASSAY OF TROPONIN QUANT: CPT | Performed by: EMERGENCY MEDICINE

## 2022-07-25 PROCEDURE — 83880 ASSAY OF NATRIURETIC PEPTIDE: CPT | Performed by: EMERGENCY MEDICINE

## 2022-07-25 PROCEDURE — C9803 HOPD COVID-19 SPEC COLLECT: HCPCS

## 2022-07-25 PROCEDURE — 71045 X-RAY EXAM CHEST 1 VIEW: CPT | Mod: 26 | Performed by: RADIOLOGY

## 2022-07-25 PROCEDURE — 214N000001 HC R&B CCU UMMC

## 2022-07-25 PROCEDURE — 70450 CT HEAD/BRAIN W/O DYE: CPT

## 2022-07-25 RX ORDER — NITROGLYCERIN 0.4 MG/1
0.4 TABLET SUBLINGUAL EVERY 5 MIN PRN
Status: DISCONTINUED | OUTPATIENT
Start: 2022-07-25 | End: 2022-07-26

## 2022-07-25 RX ORDER — NITROGLYCERIN 20 MG/100ML
10-200 INJECTION INTRAVENOUS CONTINUOUS
Status: DISCONTINUED | OUTPATIENT
Start: 2022-07-25 | End: 2022-07-27

## 2022-07-25 RX ORDER — POTASSIUM CHLORIDE 20MEQ/15ML
20 LIQUID (ML) ORAL ONCE
Status: COMPLETED | OUTPATIENT
Start: 2022-07-25 | End: 2022-07-25

## 2022-07-25 RX ORDER — FUROSEMIDE 10 MG/ML
40 INJECTION INTRAMUSCULAR; INTRAVENOUS ONCE
Status: COMPLETED | OUTPATIENT
Start: 2022-07-25 | End: 2022-07-25

## 2022-07-25 RX ADMIN — NITROGLYCERIN 10 MCG/MIN: 20 INJECTION INTRAVENOUS at 23:02

## 2022-07-25 RX ADMIN — FUROSEMIDE 40 MG: 10 INJECTION, SOLUTION INTRAVENOUS at 23:02

## 2022-07-25 RX ADMIN — POTASSIUM CHLORIDE 20 MEQ: 20 SOLUTION ORAL at 23:25

## 2022-07-26 ENCOUNTER — APPOINTMENT (OUTPATIENT)
Dept: CARDIOLOGY | Facility: CLINIC | Age: 52
DRG: 281 | End: 2022-07-26

## 2022-07-26 LAB
ANION GAP SERPL CALCULATED.3IONS-SCNC: 11 MMOL/L (ref 7–15)
BUN SERPL-MCNC: 24.6 MG/DL (ref 6–20)
CALCIUM SERPL-MCNC: 9.2 MG/DL (ref 8.6–10)
CHLORIDE SERPL-SCNC: 100 MMOL/L (ref 98–107)
CREAT SERPL-MCNC: 1.48 MG/DL (ref 0.51–0.95)
DEPRECATED HCO3 PLAS-SCNC: 27 MMOL/L (ref 22–29)
ERYTHROCYTE [DISTWIDTH] IN BLOOD BY AUTOMATED COUNT: 13.5 % (ref 10–15)
GFR SERPL CREATININE-BSD FRML MDRD: 43 ML/MIN/1.73M2
GLUCOSE SERPL-MCNC: 100 MG/DL (ref 70–99)
HCT VFR BLD AUTO: 39.5 % (ref 35–47)
HGB BLD-MCNC: 12.6 G/DL (ref 11.7–15.7)
HOLD SPECIMEN: NORMAL
INR PPP: 1.05 (ref 0.85–1.15)
LVEF ECHO: NORMAL
MCH RBC QN AUTO: 25.4 PG (ref 26.5–33)
MCHC RBC AUTO-ENTMCNC: 31.9 G/DL (ref 31.5–36.5)
MCV RBC AUTO: 80 FL (ref 78–100)
PLATELET # BLD AUTO: 276 10E3/UL (ref 150–450)
POTASSIUM SERPL-SCNC: 3.1 MMOL/L (ref 3.4–5.3)
POTASSIUM SERPL-SCNC: 3.5 MMOL/L (ref 3.4–5.3)
RBC # BLD AUTO: 4.96 10E6/UL (ref 3.8–5.2)
SARS-COV-2 RNA RESP QL NAA+PROBE: NEGATIVE
SODIUM SERPL-SCNC: 138 MMOL/L (ref 136–145)
TROPONIN T SERPL HS-MCNC: 1506 NG/L
TROPONIN T SERPL HS-MCNC: 1543 NG/L
UFH PPP CHRO-ACNC: 0.1 IU/ML
UFH PPP CHRO-ACNC: 0.19 IU/ML
WBC # BLD AUTO: 7.1 10E3/UL (ref 4–11)

## 2022-07-26 PROCEDURE — 250N000011 HC RX IP 250 OP 636: Performed by: STUDENT IN AN ORGANIZED HEALTH CARE EDUCATION/TRAINING PROGRAM

## 2022-07-26 PROCEDURE — 93306 TTE W/DOPPLER COMPLETE: CPT | Mod: 26 | Performed by: INTERNAL MEDICINE

## 2022-07-26 PROCEDURE — 80048 BASIC METABOLIC PNL TOTAL CA: CPT

## 2022-07-26 PROCEDURE — 250N000011 HC RX IP 250 OP 636: Performed by: EMERGENCY MEDICINE

## 2022-07-26 PROCEDURE — 85027 COMPLETE CBC AUTOMATED: CPT | Performed by: STUDENT IN AN ORGANIZED HEALTH CARE EDUCATION/TRAINING PROGRAM

## 2022-07-26 PROCEDURE — 250N000013 HC RX MED GY IP 250 OP 250 PS 637: Performed by: STUDENT IN AN ORGANIZED HEALTH CARE EDUCATION/TRAINING PROGRAM

## 2022-07-26 PROCEDURE — 36415 COLL VENOUS BLD VENIPUNCTURE: CPT | Performed by: STUDENT IN AN ORGANIZED HEALTH CARE EDUCATION/TRAINING PROGRAM

## 2022-07-26 PROCEDURE — 99221 1ST HOSP IP/OBS SF/LOW 40: CPT | Mod: 25 | Performed by: INTERNAL MEDICINE

## 2022-07-26 PROCEDURE — 999N000208 ECHOCARDIOGRAM COMPLETE

## 2022-07-26 PROCEDURE — 85520 HEPARIN ASSAY: CPT | Performed by: STUDENT IN AN ORGANIZED HEALTH CARE EDUCATION/TRAINING PROGRAM

## 2022-07-26 PROCEDURE — 255N000002 HC RX 255 OP 636: Performed by: STUDENT IN AN ORGANIZED HEALTH CARE EDUCATION/TRAINING PROGRAM

## 2022-07-26 PROCEDURE — 84132 ASSAY OF SERUM POTASSIUM: CPT | Performed by: STUDENT IN AN ORGANIZED HEALTH CARE EDUCATION/TRAINING PROGRAM

## 2022-07-26 PROCEDURE — 84484 ASSAY OF TROPONIN QUANT: CPT | Performed by: STUDENT IN AN ORGANIZED HEALTH CARE EDUCATION/TRAINING PROGRAM

## 2022-07-26 PROCEDURE — 99207 PR SC NO CHARGE VISIT: CPT | Performed by: INTERNAL MEDICINE

## 2022-07-26 PROCEDURE — 36415 COLL VENOUS BLD VENIPUNCTURE: CPT

## 2022-07-26 PROCEDURE — 85610 PROTHROMBIN TIME: CPT

## 2022-07-26 PROCEDURE — 214N000001 HC R&B CCU UMMC

## 2022-07-26 PROCEDURE — U0003 INFECTIOUS AGENT DETECTION BY NUCLEIC ACID (DNA OR RNA); SEVERE ACUTE RESPIRATORY SYNDROME CORONAVIRUS 2 (SARS-COV-2) (CORONAVIRUS DISEASE [COVID-19]), AMPLIFIED PROBE TECHNIQUE, MAKING USE OF HIGH THROUGHPUT TECHNOLOGIES AS DESCRIBED BY CMS-2020-01-R: HCPCS | Performed by: EMERGENCY MEDICINE

## 2022-07-26 PROCEDURE — 250N000013 HC RX MED GY IP 250 OP 250 PS 637

## 2022-07-26 RX ORDER — ACETAMINOPHEN 650 MG/1
650 SUPPOSITORY RECTAL EVERY 4 HOURS PRN
Status: DISCONTINUED | OUTPATIENT
Start: 2022-07-26 | End: 2022-07-28 | Stop reason: HOSPADM

## 2022-07-26 RX ORDER — POTASSIUM CHLORIDE 750 MG/1
20 TABLET, EXTENDED RELEASE ORAL ONCE
Status: COMPLETED | OUTPATIENT
Start: 2022-07-26 | End: 2022-07-26

## 2022-07-26 RX ORDER — LIDOCAINE 40 MG/G
CREAM TOPICAL
Status: DISCONTINUED | OUTPATIENT
Start: 2022-07-26 | End: 2022-07-28 | Stop reason: HOSPADM

## 2022-07-26 RX ORDER — POTASSIUM CHLORIDE 750 MG/1
40 TABLET, EXTENDED RELEASE ORAL ONCE
Status: COMPLETED | OUTPATIENT
Start: 2022-07-26 | End: 2022-07-26

## 2022-07-26 RX ORDER — ACETAMINOPHEN 325 MG/1
650 TABLET ORAL EVERY 4 HOURS PRN
Status: DISCONTINUED | OUTPATIENT
Start: 2022-07-26 | End: 2022-07-28 | Stop reason: HOSPADM

## 2022-07-26 RX ORDER — ASPIRIN 81 MG/1
324 TABLET, CHEWABLE ORAL ONCE
Status: COMPLETED | OUTPATIENT
Start: 2022-07-26 | End: 2022-07-26

## 2022-07-26 RX ORDER — ONDANSETRON 2 MG/ML
8 INJECTION INTRAMUSCULAR; INTRAVENOUS ONCE
Status: COMPLETED | OUTPATIENT
Start: 2022-07-26 | End: 2022-07-26

## 2022-07-26 RX ORDER — ROSUVASTATIN CALCIUM 20 MG/1
20 TABLET, COATED ORAL AT BEDTIME
Status: DISCONTINUED | OUTPATIENT
Start: 2022-07-26 | End: 2022-07-28 | Stop reason: HOSPADM

## 2022-07-26 RX ORDER — NITROGLYCERIN 0.4 MG/1
0.4 TABLET SUBLINGUAL EVERY 5 MIN PRN
Status: DISCONTINUED | OUTPATIENT
Start: 2022-07-26 | End: 2022-07-28 | Stop reason: HOSPADM

## 2022-07-26 RX ORDER — METOPROLOL SUCCINATE 25 MG/1
25 TABLET, EXTENDED RELEASE ORAL DAILY
Status: DISCONTINUED | OUTPATIENT
Start: 2022-07-26 | End: 2022-07-28 | Stop reason: HOSPADM

## 2022-07-26 RX ORDER — HEPARIN SODIUM 10000 [USP'U]/100ML
0-5000 INJECTION, SOLUTION INTRAVENOUS CONTINUOUS
Status: DISCONTINUED | OUTPATIENT
Start: 2022-07-26 | End: 2022-07-27

## 2022-07-26 RX ORDER — FUROSEMIDE 20 MG
40 TABLET ORAL DAILY
Status: DISCONTINUED | OUTPATIENT
Start: 2022-07-26 | End: 2022-07-27

## 2022-07-26 RX ORDER — MAGNESIUM HYDROXIDE/ALUMINUM HYDROXICE/SIMETHICONE 120; 1200; 1200 MG/30ML; MG/30ML; MG/30ML
30 SUSPENSION ORAL EVERY 4 HOURS PRN
Status: DISCONTINUED | OUTPATIENT
Start: 2022-07-26 | End: 2022-07-28 | Stop reason: HOSPADM

## 2022-07-26 RX ADMIN — NITROGLYCERIN 80 MCG/MIN: 20 INJECTION INTRAVENOUS at 01:43

## 2022-07-26 RX ADMIN — HEPARIN SODIUM AND DEXTROSE 700 UNITS/HR: 10000; 5 INJECTION INTRAVENOUS at 05:18

## 2022-07-26 RX ADMIN — HUMAN ALBUMIN MICROSPHERES AND PERFLUTREN 5 ML: 10; .22 INJECTION, SOLUTION INTRAVENOUS at 08:31

## 2022-07-26 RX ADMIN — FUROSEMIDE 40 MG: 20 TABLET ORAL at 12:05

## 2022-07-26 RX ADMIN — ROSUVASTATIN CALCIUM 20 MG: 20 TABLET, FILM COATED ORAL at 05:20

## 2022-07-26 RX ADMIN — HEPARIN SODIUM AND DEXTROSE 1000 UNITS/HR: 10000; 5 INJECTION INTRAVENOUS at 22:59

## 2022-07-26 RX ADMIN — POTASSIUM CHLORIDE 20 MEQ: 750 TABLET, EXTENDED RELEASE ORAL at 22:59

## 2022-07-26 RX ADMIN — ASPIRIN 81 MG CHEWABLE TABLET 324 MG: 81 TABLET CHEWABLE at 04:57

## 2022-07-26 RX ADMIN — ROSUVASTATIN CALCIUM 20 MG: 20 TABLET, FILM COATED ORAL at 20:18

## 2022-07-26 RX ADMIN — ONDANSETRON 8 MG: 2 INJECTION INTRAMUSCULAR; INTRAVENOUS at 02:36

## 2022-07-26 RX ADMIN — POTASSIUM CHLORIDE 40 MEQ: 750 TABLET, EXTENDED RELEASE ORAL at 16:47

## 2022-07-26 RX ADMIN — METOPROLOL SUCCINATE 25 MG: 25 TABLET, EXTENDED RELEASE ORAL at 07:35

## 2022-07-26 RX ADMIN — HEPARIN SODIUM AND DEXTROSE 850 UNITS/HR: 10000; 5 INJECTION INTRAVENOUS at 12:58

## 2022-07-26 RX ADMIN — NITROGLYCERIN 10 MCG/MIN: 20 INJECTION INTRAVENOUS at 09:53

## 2022-07-26 ASSESSMENT — ACTIVITIES OF DAILY LIVING (ADL)
ADLS_ACUITY_SCORE: 37

## 2022-07-26 NOTE — PROGRESS NOTES
[facility]   Cardiology   Progress Note     ASSESSMENT/PLAN:  Amparo Bonilla is a 51 year old female who has a history of CAD s/p multiple prior stents (last angio in 2015 showed nonobstructive CAD with mild ISR), antiphospholipid syndrome (on Warfarin), HTN, meth use disorder, and recent NSTEMI on 7/8 (left AMA) admitted for NSTEMI and hypertensive emergency.    Interval History:    Changes Today:  - Coronary angiogram ordered for tomorrow; NPO at Midnight   - Start PO Lasix 40mg daily    # NSTEMI  Troponin elevated at 1,117 on admission. Now 1,506.  No chest pain or ACS equivalents. Echocardiogram with normal function; EF 55-60%. RA pressure 8 mmHg.   - Continue heparin gtt until after angiogram   - ASA 81mg daily   - Continue PTA Metoprolol 25mg daily  - Continue PTA Rosuvastatin 20mg at bedtime  - Coronary Angiogram w/ Possible PCI      # Volume Overload  Elevated BNP with no known history of HF. LE edema on exam. Received 40mg IV Lasix in ED.   - TTE with mild evidence of hypervolemia. RA ~ 8mmHg  - Start Lasix 40mg PO   - K+ and Mg replacement per protocol      # ANDREA  Likely prerenal in setting of cardiac dysfunction.   - Monitor with BMPs     # Hypertensive Emergency  Initial  and dropped to 154 from nitroglycerin gtt in the ED. CTH normal.  - Nitroglycerin gtt with 25% drop in first 24 hours; discontinued   - SBP goal 180-190 until tomorrow night  - Continue PTA Metoprolol 25mg     FEN: Regular; NPO at Midnight for angiogram  Code status: Full  Prophylaxis:  Ambulation  Isolation: None  Disposition: 1-2 days pending angiogram     Patient seen and discussed with Dr. Billy, who agrees with above plan.    Jacque LEE, CNP  [facility] Cardiology Team    Physical Exam:  Temp:  [98.1  F (36.7  C)-99.2  F (37.3  C)] 98.1  F (36.7  C)  Pulse:  [] 57  Resp:  [8-37] 15  BP: (110-254)/() 182/107  SpO2:  [84 %-100 %] 96 %    I/O: No intake or output data in the 24  hours ending 07/26/22 1117    Wt:   Wt Readings from Last 5 Encounters:   07/25/22 56.7 kg (125 lb)   11/05/15 81.6 kg (180 lb)   10/18/15 76.1 kg (167 lb 12.8 oz)   10/16/15 81.6 kg (180 lb)   03/21/15 77.1 kg (170 lb)       General: NAD  HEENT:  PERRLA, EOMI.   Neck: JVD ~ 5cm .   CV: RRR. No murmur appreciated. No rubs or gallops. Peripheral radial pulse intact.  Resp: No increased work of breathing or use of accessory muscles, breathing comfortably on room air.  Lung sounds clear throughout/bilaterally  Abdomen:  Normal active bowel sounds.  Abdomen is soft. No distension, non-tender to palpation.    Extremities: Warm. Capillary refill less than 3 sec. 3/4 radial pulses bilaterally.  3/4 pedal pulses bilaterally. Mild BLE edema. No cyanosis or clubbing.  Skin:  Warm and dry. No erythema, rashes, ulceration or diaphoresis.  Neuro: Alert and oriented x3.      Medications:   metoprolol succinate ER  25 mg Oral Daily    rosuvastatin  20 mg Oral At Bedtime    sodium chloride (PF)  3 mL Intracatheter Q8H      heparin 700 Units/hr (07/26/22 0518)    - MEDICATION INSTRUCTIONS -      nitroGLYcerin 10 mcg/min (07/26/22 0953)       Labs:   CMP  Recent Labs   Lab 07/25/22 2023      POTASSIUM 3.2*   CHLORIDE 100   CO2 25   ANIONGAP 14   GLC 92   BUN 26.0*   CR 1.40*   GFRESTIMATED 46*   STANTON 9.4   PROTTOTAL 8.0   ALBUMIN 4.2   BILITOTAL 0.3   ALKPHOS 140*   AST 58*   ALT 22     CBC  Recent Labs   Lab 07/26/22  0614 07/25/22 2023   WBC 7.1 7.9   RBC 4.96 4.99   HGB 12.6 12.7   HCT 39.5 39.8   MCV 80 80   MCH 25.4* 25.5*   MCHC 31.9 31.9   RDW 13.5 13.7    291     INRNo lab results found in last 7 days.  Arterial Blood GasNo lab results found in last 7 days.    Diagnostics:  ECG:    Echo:    Stress test:    Coronary angiogram:    Recent Results (from the past 24 hour(s))   XR Chest Port 1 View    Narrative    EXAM: XR Chest 1 view 7/25/2022 9:04 PM      HISTORY: Shortness of breath, recent MI. Concern or  CHF.    COMPARISON: CT of the chest dated 2015.     TECHNIQUE: Frontal view of the chest.    FINDINGS: Trachea is midline. Cardiomediastinal silhouette is not  enlarged. Coronary artery stent. No focal pulmonary opacities. No  pleural effusions. No pneumothoraces. No acute osseous abnormalities.      Impression    IMPRESSION: No focal pulmonary opacities.    I have personally reviewed the examination and initial interpretation  and I agree with the findings.    KAMERON BAUTISTA DO         SYSTEM ID:  [id number]   CT Head w/o Contrast    Narrative    EXAM: CT HEAD W/O CONTRAST  LOCATION: [facility]  DATE/TIME: 2022 12:05 AM    INDICATION: Hypertensive emergency.  COMPARISON: None.  TECHNIQUE: Routine CT Head without IV contrast. Multiplanar reformats. Dose reduction techniques were used.    FINDINGS:  INTRACRANIAL CONTENTS: No intracranial hemorrhage, extraaxial collection, or mass effect.  No CT evidence of acute infarct. Normal parenchymal attenuation. Normal ventricles and sulci.     VISUALIZED ORBITS/SINUSES/MASTOIDS: No intraorbital abnormality. No paranasal sinus mucosal disease. No middle ear or mastoid effusion.    BONES/SOFT TISSUES: No acute abnormality.      Impression    IMPRESSION:  1.  Normal head CT.   Echo Complete   Result Value    LVEF  55-60%    Narrative    [id number]  [id number]  [id number]  [id number]^SHERI^JUDIE     [facility]  Echocardiography Laboratory  [address] [place] [id number]     Name: JEANA AGUSTIN  MRN: [id number]  : 1970  Study Date: 2022 08:20 AM  Age: 51 yrs  Gender: Female  Patient Location: [facility]  Reason For Study: Acute Myocardial Infarction  Ordering Physician: JUDIE WADE  Performed By: Beatriz Severino RDCS     BSA: 1.6 m2  Height: 62 in  Weight: 125 lb  HR: 62  BP: 174/119  mmHg  ______________________________________________________________________________  Procedure  Complete Portable Echo Adult. Contrast Optison. Optison (NDC #8676-1922-47)  given intravenously. Patient was given 5 ml mixture of 3 ml Optison and 6 ml  saline. 4 ml wasted.  ______________________________________________________________________________  Interpretation Summary  Global and regional left ventricular function is normal with an EF of 55-60%.  Moderate concentric wall thickening consistent with left ventricular  hypertrophy is present.  Global right ventricular function is normal.  No significant valvular abnormalities present.  IVC diameter and respiratory changes fall into an intermediate range  suggesting an RA pressure of 8 mmHg.  No pericardial effusion is present.  ______________________________________________________________________________  Left Ventricle  Global and regional left ventricular function is normal with an EF of 55-60%.  Left ventricular size is normal. Moderate concentric wall thickening  consistent with left ventricular hypertrophy is present. Left ventricular  diastolic function is indeterminate.     Right Ventricle  The right ventricle is normal size. Global right ventricular function is  normal.     Atria  Both atria appear normal.     Mitral Valve  The mitral valve is normal.     Aortic Valve  The aortic valve is tricuspid. Mild aortic valve calcification is present.     Tricuspid Valve  The tricuspid valve is normal. Trace tricuspid insufficiency is present. The  peak velocity of the tricuspid regurgitant jet is not obtainable.     Pulmonic Valve  The pulmonic valve is normal.     Vessels  The thoracic aorta is normal. IVC diameter and respiratory changes fall into  an intermediate range suggesting an RA pressure of 8 mmHg.     Pericardium  No pericardial effusion is present.     Miscellaneous  No significant valvular abnormalities present.     Compared to Previous Study  No  significant changes noted.  ______________________________________________________________________________  MMode/2D Measurements & Calculations  RVDd: 3.9 cm  IVSd: 1.3 cm  LVIDd: 4.1 cm  LVIDs: 3.0 cm  LVPWd: 1.4 cm  FS: 26.5 %  LV mass(C)d: 207.4 grams  LV mass(C)dI: 132.5 grams/m2  asc Aorta Diam: 3.2 cm  LVOT diam: 2.0 cm  LVOT area: 3.1 cm2  LA Volume (BP): 37.6 ml     LA Volume Index (BP): 23.9 ml/m2  RWT: 0.68     Doppler Measurements & Calculations  MV E max sandra: 45.0 cm/sec  MV A max sandra: 62.7 cm/sec  MV E/A: 0.72  MV dec slope: 250.5 cm/sec2  MV dec time: 0.18 sec  E/E' av.8  Lateral E/e': 6.5  Medial E/e': 7.1     ______________________________________________________________________________  Report approved by: Randi Younger 2022 10:00 AM             Time Spent on this Encounter   I spent 30 minutes on the unit/floor managing the care of Amparo Bonilla. Over 50% of my time was spent on the following:   - Counseling the patient and/or family regarding: diagnostic results and risks and benefits of treatment options  - Coordination of care with the: nurse    ROSA Gracia CNP    Attending Attestation: I saw, examined and evaluated the patient and reviewed all relevant data. I agree with the findings, assessment and plan of care documented in the edited note and summarized the shafer findings and plan with the patient.

## 2022-07-26 NOTE — ED TRIAGE NOTES
"Patient at [facility] a few days ago, dx with \"heart attack\". Left AMA. Patient now has swelling in lower extremities.      Triage Assessment     Row Name 07/25/22 2014       Triage Assessment (Adult)    Airway WDL WDL       Respiratory WDL    Respiratory WDL WDL       Skin Circulation/Temperature WDL    Skin Circulation/Temperature WDL WDL       Cardiac WDL    Cardiac WDL X;chest pain       Chest Pain Assessment    Chest Pain Location midsternal  PTA    Chest Pain Intervention 12-lead ECG obtained       Peripheral/Neurovascular WDL    Peripheral Neurovascular WDL WDL       Cognitive/Neuro/Behavioral WDL    Cognitive/Neuro/Behavioral WDL WDL              "

## 2022-07-26 NOTE — ED PROVIDER NOTES
"ED Provider Note  [facility]      History     Chief Complaint   Patient presents with     Chest Pain     The history is provided by the patient and medical records.     Amparo Bonilla is a 51 year old female with a PMH of CAD s/p multiple prior stents, antiphospholipid syndrome, anticoagulated on Coumadin, HTN, meth abuse, and recent NSTEMI on 7/8 presenting to the ED with shortness of breath and LE edema. Patient reports increased dyspnea on exertion since her recent heart attack. Dyspnea is not worse when flat. She had brief recurrence of chest pain for a few seconds yesterday, otherwise no chest pain. Her ankles have become increasingly swollen in the past few days. This has never happened to her before. She states that she is not on any regular medications including diuretics. She denies recent illicit drug use. No other symptoms noted.    Patient states she was seen at [facility] a few days ago for a \"heart attack\", but left AMA.  Patient now has swelling in her lower extremities.    Patient was recently seen at [facility] ER on 7/8/2022 reporting chest pain.  Troponin was trended to 0.096-1.376, thought to have NSTEMI. Chest pain resolved with sublingual nitroglycerin.  She denies IV drug use but has a noted history of amphetamine abuse and was reportedly picked up from a \"drug house\".  Her urine drug screen showed current amphetamine use.     Past Medical History  Past Medical History:   Diagnosis Date     Anxiety      Back pain      Coarctation of aorta      Coronary artery disease     s/p stent to LAD, and TALI to RCA (06/2013)     Hypertension      Renal artery stenosis (H)      Past Surgical History:   Procedure Laterality Date     ANGIOPLASTY       bilateral tubal ligat  1994     CHOLECYSTECTOMY       coronary stent  03/2013     ESOPHAGOSCOPY, GASTROSCOPY, DUODENOSCOPY (EGD), COMBINED  6/5/2013    Procedure: COMBINED ESOPHAGOSCOPY, GASTROSCOPY, DUODENOSCOPY (EGD);  Brushing to " "esophagus with cytology brush;  Surgeon: Hector Matute MD;  Location:  GI     LAPAROSCOPIC CHOLECYSTECTOMY  12/2012     SURGICAL HISTORY OF -   age 10    PE tubes     TUBAL LIGATION       aspirin 81 MG chewable tablet  GABAPENTIN PO  IBUPROFEN PO      Allergies   Allergen Reactions     Nucynta Er [Tapentadol Hcl] Other (See Comments)     Chest pain, light headed and dizzy     Aspirin Nausea and Vomiting     Pt states she is \"allergic to aspirin, but not baby aspirin (81 mg)\"     Cyclobenzaprine Other (See Comments)     Restless legs     Imitrex [Sumatriptan Succinate] Palpitations     Lisinopril Other (See Comments) and Rash     headache     Family History  Family History   Problem Relation Age of Onset     Neurologic Disorder Son         epilepsy     C.A.D. Father         MI age 45     Hypertension Mother      Alcohol/Drug Father         etoh abuse, now decesased from kidney failure     Social History   Social History     Tobacco Use     Smoking status: Current Every Day Smoker     Packs/day: 0.25     Years: 20.00     Pack years: 5.00     Types: Cigarettes     Smokeless tobacco: Never Used     Tobacco comment: 2-3 cigarettes a day   Substance Use Topics     Alcohol use: No     Drug use: No      Past medical history, past surgical history, medications, allergies, family history, and social history were reviewed with the patient. No additional pertinent items.       Review of Systems  A complete review of systems was performed with pertinent positives and negatives noted in the HPI, and all other systems negative.    Physical Exam   Pulse: 109  Temp: 99.2  F (37.3  C)  Resp: 16  Weight: 56.7 kg (125 lb)  SpO2: 100 %  Physical Exam  Vitals and nursing note reviewed.   Constitutional:       General: She is not in acute distress.     Appearance: She is well-developed. She is not diaphoretic.   HENT:      Head: Normocephalic and atraumatic.      Mouth/Throat:      Pharynx: No oropharyngeal exudate.   Eyes:      " General: No scleral icterus.        Right eye: No discharge.         Left eye: No discharge.      Pupils: Pupils are equal, round, and reactive to light.   Cardiovascular:      Rate and Rhythm: Normal rate and regular rhythm.      Heart sounds: Normal heart sounds. No murmur heard.    No friction rub. No gallop.   Pulmonary:      Effort: Pulmonary effort is normal. No respiratory distress.      Breath sounds: Normal breath sounds. No wheezing.   Chest:      Chest wall: No tenderness.   Abdominal:      General: Bowel sounds are normal. There is no distension.      Palpations: Abdomen is soft.      Tenderness: There is no abdominal tenderness.   Musculoskeletal:         General: No tenderness or deformity. Normal range of motion.      Cervical back: Normal range of motion and neck supple.      Right lower leg: Edema present.      Left lower leg: Edema present.   Skin:     General: Skin is warm and dry.      Coloration: Skin is not pale.      Findings: No erythema or rash.   Neurological:      Mental Status: She is alert and oriented to person, place, and time.      Cranial Nerves: No cranial nerve deficit.         ED Course      Procedures            EKG Interpretation:      Interpreted by Aftab Carter DO  Time reviewed: 2032  Symptoms at time of EKG: shortness of breath   Rhythm: normal sinus   Rate: 96  Axis: Normal  Ectopy: none  Conduction: normal  ST Segments/ T Waves: No acute ischemic changes  Q Waves: none  Comparison to prior: Unchanged from 12/15/2015    Clinical Impression: no acute changes                 Critical Care time was 30 minutes for this patient excluding procedures.         No results found for any visits on 07/25/22.  Medications - No data to display     Assessments & Plan (with Medical Decision Making)   Is a 51-year-old female with a history of CAD with previous stent placement and recent NSTEMI on 7/8/2022 who presents with peripheral edema and shortness of breath.  Patient is not  currently taking any medications.  She notes peripheral edema and shortness of breath has been ongoing for the past week.  No previous similar occurrences in the past.  On exam she has peripheral edema.  Initial blood pressure was 250s over 130s.  ECG shows no acute changes.  Lab work shows troponin of 1117.  proBNP is 2/9/2006.  Potassium is 3.2.  Chest x-ray shows no acute abnormalities.  Patient was given nitro, furosemide and potassium.  Patient was started on nitro drip.  I discussed case with Cardiology who recommends head CT due to hypertension.  Head CT results are pending at this time.  Patient will be admitted to the Cardiology service.    I have reviewed the nursing notes. I have reviewed the findings, diagnosis, plan and need for follow up with the patient.    New Prescriptions    No medications on file       Final diagnoses:   None   ISoha, am serving as a trained medical scribe to document services personally performed by Aftab Carter DO, based on the provider's statements to me.     Aftab CISNEROS DO, was physically present and have reviewed and verified the accuracy of this note documented by Soha Dyer.      --  Aftab Carter DO  [facility] EMERGENCY DEPARTMENT  7/25/2022          Aftab Carter DO  07/26/22 0101

## 2022-07-27 LAB
ANION GAP SERPL CALCULATED.3IONS-SCNC: 16 MMOL/L (ref 7–15)
BUN SERPL-MCNC: 30.2 MG/DL (ref 6–20)
CALCIUM SERPL-MCNC: 9.2 MG/DL (ref 8.6–10)
CHLORIDE SERPL-SCNC: 102 MMOL/L (ref 98–107)
CREAT SERPL-MCNC: 1.49 MG/DL (ref 0.51–0.95)
DEPRECATED HCO3 PLAS-SCNC: 21 MMOL/L (ref 22–29)
ERYTHROCYTE [DISTWIDTH] IN BLOOD BY AUTOMATED COUNT: 13.4 % (ref 10–15)
GFR SERPL CREATININE-BSD FRML MDRD: 42 ML/MIN/1.73M2
GLUCOSE SERPL-MCNC: 96 MG/DL (ref 70–99)
HCT VFR BLD AUTO: 40.4 % (ref 35–47)
HGB BLD-MCNC: 12.5 G/DL (ref 11.7–15.7)
INR PPP: 1.04 (ref 0.85–1.15)
MAGNESIUM SERPL-MCNC: 2.3 MG/DL (ref 1.7–2.3)
MCH RBC QN AUTO: 25.2 PG (ref 26.5–33)
MCHC RBC AUTO-ENTMCNC: 30.9 G/DL (ref 31.5–36.5)
MCV RBC AUTO: 82 FL (ref 78–100)
PLATELET # BLD AUTO: 248 10E3/UL (ref 150–450)
POTASSIUM SERPL-SCNC: 3.7 MMOL/L (ref 3.4–5.3)
POTASSIUM SERPL-SCNC: 3.8 MMOL/L (ref 3.4–5.3)
RBC # BLD AUTO: 4.96 10E6/UL (ref 3.8–5.2)
SODIUM SERPL-SCNC: 139 MMOL/L (ref 136–145)
UFH PPP CHRO-ACNC: 0.36 IU/ML
UFH PPP CHRO-ACNC: 0.39 IU/ML
WBC # BLD AUTO: 6 10E3/UL (ref 4–11)

## 2022-07-27 PROCEDURE — 250N000011 HC RX IP 250 OP 636: Performed by: STUDENT IN AN ORGANIZED HEALTH CARE EDUCATION/TRAINING PROGRAM

## 2022-07-27 PROCEDURE — 250N000013 HC RX MED GY IP 250 OP 250 PS 637: Performed by: STUDENT IN AN ORGANIZED HEALTH CARE EDUCATION/TRAINING PROGRAM

## 2022-07-27 PROCEDURE — 85027 COMPLETE CBC AUTOMATED: CPT | Performed by: STUDENT IN AN ORGANIZED HEALTH CARE EDUCATION/TRAINING PROGRAM

## 2022-07-27 PROCEDURE — 85610 PROTHROMBIN TIME: CPT | Performed by: STUDENT IN AN ORGANIZED HEALTH CARE EDUCATION/TRAINING PROGRAM

## 2022-07-27 PROCEDURE — 36415 COLL VENOUS BLD VENIPUNCTURE: CPT | Performed by: STUDENT IN AN ORGANIZED HEALTH CARE EDUCATION/TRAINING PROGRAM

## 2022-07-27 PROCEDURE — 80048 BASIC METABOLIC PNL TOTAL CA: CPT

## 2022-07-27 PROCEDURE — 214N000001 HC R&B CCU UMMC

## 2022-07-27 PROCEDURE — 84132 ASSAY OF SERUM POTASSIUM: CPT | Performed by: STUDENT IN AN ORGANIZED HEALTH CARE EDUCATION/TRAINING PROGRAM

## 2022-07-27 PROCEDURE — 83735 ASSAY OF MAGNESIUM: CPT | Performed by: STUDENT IN AN ORGANIZED HEALTH CARE EDUCATION/TRAINING PROGRAM

## 2022-07-27 PROCEDURE — 85520 HEPARIN ASSAY: CPT | Performed by: STUDENT IN AN ORGANIZED HEALTH CARE EDUCATION/TRAINING PROGRAM

## 2022-07-27 PROCEDURE — 99232 SBSQ HOSP IP/OBS MODERATE 35: CPT | Performed by: INTERNAL MEDICINE

## 2022-07-27 PROCEDURE — 250N000013 HC RX MED GY IP 250 OP 250 PS 637

## 2022-07-27 RX ORDER — FUROSEMIDE 20 MG
20 TABLET ORAL DAILY
Status: DISCONTINUED | OUTPATIENT
Start: 2022-07-28 | End: 2022-07-27

## 2022-07-27 RX ORDER — LOSARTAN POTASSIUM 25 MG/1
25 TABLET ORAL DAILY
Status: DISCONTINUED | OUTPATIENT
Start: 2022-07-27 | End: 2022-07-28

## 2022-07-27 RX ORDER — WARFARIN SODIUM 5 MG/1
5 TABLET ORAL
Status: COMPLETED | OUTPATIENT
Start: 2022-07-27 | End: 2022-07-27

## 2022-07-27 RX ORDER — CLOPIDOGREL BISULFATE 75 MG/1
75 TABLET ORAL DAILY
Status: DISCONTINUED | OUTPATIENT
Start: 2022-07-27 | End: 2022-07-28 | Stop reason: HOSPADM

## 2022-07-27 RX ORDER — LOSARTAN POTASSIUM 25 MG/1
25 TABLET ORAL DAILY
Status: DISCONTINUED | OUTPATIENT
Start: 2022-07-27 | End: 2022-07-27

## 2022-07-27 RX ORDER — FUROSEMIDE 40 MG
40 TABLET ORAL DAILY
Status: DISCONTINUED | OUTPATIENT
Start: 2022-07-28 | End: 2022-07-28

## 2022-07-27 RX ORDER — CHLORTHALIDONE 25 MG/1
25 TABLET ORAL DAILY
Status: DISCONTINUED | OUTPATIENT
Start: 2022-07-27 | End: 2022-07-28 | Stop reason: HOSPADM

## 2022-07-27 RX ORDER — POTASSIUM CHLORIDE 750 MG/1
20 TABLET, EXTENDED RELEASE ORAL ONCE
Status: COMPLETED | OUTPATIENT
Start: 2022-07-27 | End: 2022-07-27

## 2022-07-27 RX ADMIN — CHLORTHALIDONE 25 MG: 25 TABLET ORAL at 14:42

## 2022-07-27 RX ADMIN — METOPROLOL SUCCINATE 25 MG: 25 TABLET, EXTENDED RELEASE ORAL at 08:37

## 2022-07-27 RX ADMIN — CLOPIDOGREL BISULFATE 75 MG: 75 TABLET ORAL at 17:02

## 2022-07-27 RX ADMIN — ROSUVASTATIN CALCIUM 20 MG: 20 TABLET, FILM COATED ORAL at 21:11

## 2022-07-27 RX ADMIN — LOSARTAN POTASSIUM 25 MG: 25 TABLET, FILM COATED ORAL at 12:23

## 2022-07-27 RX ADMIN — WARFARIN SODIUM 5 MG: 5 TABLET ORAL at 17:49

## 2022-07-27 RX ADMIN — HEPARIN SODIUM AND DEXTROSE 1000 UNITS/HR: 10000; 5 INJECTION INTRAVENOUS at 02:07

## 2022-07-27 RX ADMIN — POTASSIUM CHLORIDE 20 MEQ: 750 TABLET, EXTENDED RELEASE ORAL at 10:28

## 2022-07-27 RX ADMIN — FUROSEMIDE 40 MG: 20 TABLET ORAL at 08:36

## 2022-07-27 ASSESSMENT — ACTIVITIES OF DAILY LIVING (ADL)
ADLS_ACUITY_SCORE: 24
ADLS_ACUITY_SCORE: 37
ADLS_ACUITY_SCORE: 24
ADLS_ACUITY_SCORE: 22
ADLS_ACUITY_SCORE: 37
ADLS_ACUITY_SCORE: 24

## 2022-07-27 NOTE — PLAN OF CARE
"[facility] [place]    Patient admitted to room 6415-1 at approximately 1512 via wheel chair from emergency room. Patient was accompanied by significant other (Isaac).    Verbal SBAR report received from Kandi (SEN) prior to patient arrival.     Patient ambulated to bed independently. Patient alert and oriented X 3. The patient is not having any pain.  . Admission vital signs: Blood pressure (!) 202/116, pulse 65, temperature 97.7  F (36.5  C), temperature source Oral, resp. rate 16, height 1.575 m (5' 2\"), weight 56.7 kg (125 lb), SpO2 100 %. Patient was oriented to plan of care, call light, bed controls, tv, telephone and bathroom.     Risk Assessment    The following safety risks were identified during admission: none. Yellow risk band applied: NO.     Skin Initial Assessment    This writer admitted this patient and completed a full double check skin assessment and Scout score in the Adult PCS flowsheet. Appropriate interventions initiated as needed.     Secondary skin check completed by SEN JOVEL.    Scout Risk Assessment  Sensory Perception: 4-->no impairment  Moisture: 4-->rarely moist  Activity: 3-->walks occasionally  Mobility: 4-->no limitation  Nutrition: 3-->adequate  Friction and Shear: 2-->potential problem  Scout Score: 20    Education    Patient has a Marshall to Observation order: No  Observation education completed and documented: N/A      "

## 2022-07-27 NOTE — PROGRESS NOTES
Received call from cath lab stating that they do not feel comfortable performing coronary angiogram on patient with her history of medication non-compliance. They recommend medical management with Plavix for at least 2 weeks and if still symptomatic at that time patient should return for outpatient coronary angiogram. Discussed with patient. At current she is chest pain free. She states that she has been off Coumadin for several years as she could not afford it in the past when she wasn't working. She admits to recent non-compliance with medications. She is agreeable to resuming Coumadin and adhering to Plavix and new blood pressure medications. She will likely be able to discharge tomorrow if BP's are stable but will need outpatient INR management, close home BP monitoring, and close outpatient cardiology follow up. ROSA Gracia CNP on 7/27/2022 at 3:58 PM

## 2022-07-27 NOTE — PROGRESS NOTES
VSS. Denies pain. Significant other present at bedside at time of transfer. Patient goal to bring down blood pressure, walk unit and learn more about medication regimen changes.    Neuro: vision corrected for reading     -glasses available    Respiratory: WDL   -deep breaths encouraged    CV: Irregular vital signs - hypertensive crisis is the admitting diagnosis   -EKG monitoring   -Medication regimen given as ordered (Plavix, Warfarin)   -Trending pressures upon admission to unit   -Hep gtt discontinued and bridging to PO   -Heart Rhythm: SR  Outstanding lab(s): Creat 1.49   Replacement protocol(s): K 3.8 (replaced in ED), Mg recheck 0500 7/28    GI: WDL    : WDL    I&O: Reference flowsheets    Musculoskeletal: WDL    Psychosocial: WDL   -Choices given, questions answered and emotional support given    Skin: WDL - double skin assessment completed   -PIV running continuous Hep gtt    Plan: Medication management and discharge 7/28? Not a candidate for cath lab procedure r/t non-compliance      Provider(s) involved in care: cardiology  Provider Notification(s): NA      Problem: IADL (Instrumental Activities of Daily Living) Impairment  Goal: Optimal Safe IADL Peformance  7/27/2022 1523 by Rosa Foy RN  Outcome: Ongoing, Not Progressing    Problem: Plan of Care - These are the overarching goals to be used throughout the patient stay.    Goal: Plan of Care Review/Shift Note  Description: The Plan of Care Review/Shift note should be completed every shift.  The Outcome Evaluation is a brief statement about your assessment that the patient is improving, declining, or no change.  This information will be displayed automatically on your shift note.  Outcome: Ongoing, Progressing  Flowsheets (Taken 7/27/2022 1523)  Plan of Care Reviewed With: patient  Outcome Evaluation: Pressures still elevated, pending orders for medication regimen changes.  Overall Patient Progress: no change  Goal: Patient-Specific Goal  "(Individualized)  Description: You can add care plan individualizations to a care plan. Examples of Individualization might be:  \"Parent requests to be called daily at 9am for status\", \"I have a hard time hearing out of my right ear\", or \"Do not touch me to wake me up as it startles me\".  Outcome: Ongoing, Progressing  Goal: Absence of Hospital-Acquired Illness or Injury  Outcome: Ongoing, Progressing  Goal: Optimal Comfort and Wellbeing  Outcome: Ongoing, Progressing  Goal: Readiness for Transition of Care  Outcome: Ongoing, Progressing     Problem: Adjustment to Illness (Heart Failure)  Goal: Optimal Coping  Outcome: Ongoing, Progressing     Problem: Cardiac Output Decreased (Heart Failure)  Goal: Optimal Cardiac Output  Outcome: Ongoing, Progressing     Problem: Dysrhythmia (Heart Failure)  Goal: Stable Heart Rate and Rhythm  7/27/2022 1523 by Rosa Foy RN  Outcome: Ongoing, Progressing  7/27/2022 1522 by Rosa Foy RN  Outcome: Ongoing, Progressing     Problem: Fluid Imbalance (Heart Failure)  Goal: Fluid Balance  Outcome: Ongoing, Progressing     Problem: Functional Ability Impaired (Heart Failure)  Goal: Optimal Functional Ability  Outcome: Ongoing, Progressing     Problem: Oral Intake Inadequate (Heart Failure)  Goal: Optimal Nutrition Intake  Outcome: Ongoing, Progressing     Problem: Respiratory Compromise (Heart Failure)  Goal: Effective Oxygenation and Ventilation  Outcome: Ongoing, Progressing     Problem: Sleep Disordered Breathing (Heart Failure)  Goal: Effective Breathing Pattern During Sleep  Outcome: Ongoing, Progressing    "

## 2022-07-27 NOTE — PHARMACY-ANTICOAGULATION SERVICE
"Clinical Pharmacy - Warfarin Dosing Consult     Pharmacy has been consulted to manage this patient s warfarin therapy.  Indication: Other - specify in comments (Antiphospholipid syndrome)  Therapy Goal: INR 2-3  Provider/Team: Jacque Dean (Cards 1)  Warfarin Prior to Admission: No  Significant drug interactions: Heparin infusion, rosuvastatin  Dose Comments: Patient reports taking warfarin \"years ago\" but has not taken recently, she is unable to recall dose she was on when she did take    INR   Date Value Ref Range Status   07/27/2022 1.04 0.85 - 1.15 Final   07/26/2022 1.05 0.85 - 1.15 Final     Chromogenic Factor 10   Date Value Ref Range Status   01/23/2014 44 (L) 70 - 130 % Final     Comment:     Therapeutic Range:  A Chromogenic Factor 10 level of approximately 20-40%   inversely correlates with an INR of 2-3 for patients receiving Warfarin.   Chromogenic Factor 10 levels below 20% indicate an INR greater than 3 and   levels above 40% indicate an INR less than 2.       Recommend warfarin 5 mg today.  Pharmacy will monitor Amparo Bonilla daily and order warfarin doses to achieve specified goal.      Please contact pharmacy as soon as possible if the warfarin needs to be held for a procedure or if the warfarin goals change.      "

## 2022-07-27 NOTE — PROGRESS NOTES
[facility]   Cardiology   Progress Note     ASSESSMENT/PLAN:  Amparo Bonilla is a 51 year old female who has a history of CAD s/p multiple prior stents (last angio in 2015 showed nonobstructive CAD with mild ISR), antiphospholipid syndrome (on Warfarin), HTN, meth use disorder, and recent NSTEMI on 7/8 (left AMA) admitted for NSTEMI and hypertensive emergency.     Interval History: No acute changes overnight. Patient remains chest pain free on nitroglycerine drip. Remains hypertensive; will start losartan and up titrate as able. Plan for coronary angiogram today.      Changes Today:  - Coronary angiogram today  - Losartan 25mg daily   - Wean nitroglycerine gtt as able     # NSTEMI  Troponin elevated at 1,117 on admission --> 1,543 --> 1,506.  No chest pain or ACS equivalents. Echocardiogram with normal function; EF 55-60%. RA pressure 8 mmHg.   - Continue heparin gtt until after angiogram   - ASA 81mg daily   - Continue PTA Metoprolol 25mg daily  - Continue PTA Rosuvastatin 20mg at bedtime  - Coronary Angiogram w/ Possible PCI      # Hypervolemia   # ANDREA  Elevated BNP with no known history of HF. LE edema on exam. Received 40mg IV Lasix in ED. TTE with mild evidence of hypervolemia. RA ~ 8mmHg. Cr 1.48 on admission. Now 1.49.   - Continue Lasix 40mg PO   - K+ and Mg replacement per protocol   - Daily BMP's     # Hypertensive Emergency  Initial  and dropped to 154 from nitroglycerin gtt in the ED. CTH normal.  - Nitroglycerin gtt with 25% drop in first 24 hours; discontinued   - SBP goal 180-190 until tomorrow night  - Continue PTA Metoprolol 25mg   - Start Losartan 25mg; will up titrate as able with renal function   - Continue to wean nitroglycerine drip as able     # Antiphospholipid Syndrome  Patient should be on coumadin but admits to not taking her medications. Per chart review, she has been non compliant with INR clinic and has been discharged multiple times from INR  clinics for non compliance.   - Encourage medication compliance  - Attempt to resume Coumadin post angiogram      FEN: Regular; NPO at Midnight for angiogram  Code status: Full  Prophylaxis:  Ambulation  Isolation: None  Disposition: 1-2 days pending angiogram      Patient seen and discussed with Dr. Billy, who agrees with above plan.    Jacque LEE, CNP  [facility] Cardiology Team    Physical Exam:  Temp:  [97.7  F (36.5  C)-98  F (36.7  C)] 97.7  F (36.5  C)  Pulse:  [50-83] 65  Resp:  [10-27] 14  BP: (145-184)/() 153/106  SpO2:  [93 %-100 %] 100 %    I/O:   Intake/Output Summary (Last 24 hours) at 7/27/2022 1053  Last data filed at 7/27/2022 0700  Gross per 24 hour   Intake 934.28 ml   Output --   Net 934.28 ml       Wt:   Wt Readings from Last 5 Encounters:   07/25/22 56.7 kg (125 lb)   11/05/15 81.6 kg (180 lb)   10/18/15 76.1 kg (167 lb 12.8 oz)   10/16/15 81.6 kg (180 lb)   03/21/15 77.1 kg (170 lb)     General: NAD  HEENT:  PERRLA, EOMI.   Neck: JVD ~ 5cm .   CV: RRR. No murmur appreciated. No rubs or gallops. Peripheral radial pulse intact.  Resp: No increased work of breathing or use of accessory muscles, breathing comfortably on room air.  Lung sounds clear throughout/bilaterally  Abdomen:  Normal active bowel sounds.  Abdomen is soft. No distension, non-tender to palpation.    Extremities: Warm. Capillary refill less than 3 sec. 3/4 radial pulses bilaterally.  3/4 pedal pulses bilaterally. Mild BLE edema. No cyanosis or clubbing.  Skin:  Warm and dry. No erythema, rashes, ulceration or diaphoresis.  Neuro: Alert and oriented x3.      Medications:    furosemide  40 mg Oral Daily     metoprolol succinate ER  25 mg Oral Daily     rosuvastatin  20 mg Oral At Bedtime     sodium chloride (PF)  3 mL Intracatheter Q8H       heparin 1,000 Units/hr (07/27/22 0835)     - MEDICATION INSTRUCTIONS -       nitroGLYcerin Stopped (07/26/22 1206)       Labs:   CMP  Recent Labs   Lab 07/27/22  0556 07/26/22  2119  22  1048 22     --  138 139   POTASSIUM 3.8  3.7 3.5 3.1* 3.2*   CHLORIDE 102  --  100 100   CO2 21*  --  27 25   ANIONGAP 16*  --  11 14   GLC 96  --  100* 92   BUN 30.2*  --  24.6* 26.0*   CR 1.49*  --  1.48* 1.40*   GFRESTIMATED 42*  --  43* 46*   STANTON 9.2  --  9.2 9.4   MAG 2.3  --   --   --    PROTTOTAL  --   --   --  8.0   ALBUMIN  --   --   --  4.2   BILITOTAL  --   --   --  0.3   ALKPHOS  --   --   --  140*   AST  --   --   --  58*   ALT  --   --   --  22     CBC  Recent Labs   Lab 22  0556 22  0614 22   WBC 6.0 7.1 7.9   RBC 4.96 4.96 4.99   HGB 12.5 12.6 12.7   HCT 40.4 39.5 39.8   MCV 82 80 80   MCH 25.2* 25.4* 25.5*   MCHC 30.9* 31.9 31.9   RDW 13.4 13.5 13.7    276 291     INR  Recent Labs   Lab 22  1048   INR 1.05     Arterial Blood GasNo lab results found in last 7 days.    Diagnostics:    22 EC/26/22 Echo:  Global and regional left ventricular function is normal with an EF of 55-60%.  Moderate concentric wall thickening consistent with left ventricular  hypertrophy is present.  Global right ventricular function is normal.  No significant valvular abnormalities present.  IVC diameter and respiratory changes fall into an intermediate range  suggesting an RA pressure of 8 mmHg.  No pericardial effusion is present.    No results found for this or any previous visit (from the past 24 hour(s)).    Time Spent on this Encounter   I spent 30 minutes on the unit/floor managing the care of Amparo Bonilla. Over 50% of my time was spent on the following:   - Counseling the patient and/or family regarding: prognosis and risks and benefits of treatment options  - Coordination of care with the: nurse    ROSA Gracia CNP    Attending Attestation: Patient seen and examined with ROSA Gracia CNP. The history and physical findings are accurate as recorded. My additional findings, if any, have been incorporated into the  body of the note. All labs, imaging studies, ECG and telemetry data have been reviewed personally. The assessment and plan outlined reflect our joint decision making.

## 2022-07-27 NOTE — CONSULTS
Discharge Pharmacy Test Claim    Per chart review, patient is uninsured.      Discharge Pharmacy has one-time use 30-day free trial voucher for xarelto, eliquis, or pradaxa. Subsequent fills would be $632/mo (Xarelto), $640/mo (Eliquis), or $612/mo (Pradaxa).     Both Xarelto and Eliquis have income-based patient assistance programs that ships free drug directly to pt's home if eligible. Eligibilty information and applications can be found at:   Xarelto: https://www.PlastiPure.org, ph: 3-822-345-2803  Eliquis: http://www.[employer].org, ph: 9-422-702-8488    For comparison, 14 syringes of enoxaparin without insurance is $293.04 and 30 tablets of jantoven/warfarin is $16.      Aleksandra Herrera  [facility] Pharmacy Liaison  Ph: [phone] Pager: [phone]

## 2022-07-27 NOTE — PROGRESS NOTES
Care Coordinator  D/I: Pt transferred to 6C. Per facesheet No insurance listed--I sent email to: PFR Soraya rutherford to f/u (she said she had preferred one previously).  P: Will follow.

## 2022-07-28 VITALS
SYSTOLIC BLOOD PRESSURE: 147 MMHG | TEMPERATURE: 99.5 F | HEIGHT: 62 IN | OXYGEN SATURATION: 100 % | BODY MASS INDEX: 23.37 KG/M2 | DIASTOLIC BLOOD PRESSURE: 83 MMHG | WEIGHT: 127 LBS | HEART RATE: 68 BPM | RESPIRATION RATE: 16 BRPM

## 2022-07-28 LAB
ANION GAP SERPL CALCULATED.3IONS-SCNC: 10 MMOL/L (ref 7–15)
BUN SERPL-MCNC: 37.8 MG/DL (ref 6–20)
CALCIUM SERPL-MCNC: 9.4 MG/DL (ref 8.6–10)
CHLORIDE SERPL-SCNC: 101 MMOL/L (ref 98–107)
CREAT SERPL-MCNC: 1.61 MG/DL (ref 0.51–0.95)
DEPRECATED HCO3 PLAS-SCNC: 27 MMOL/L (ref 22–29)
GFR SERPL CREATININE-BSD FRML MDRD: 39 ML/MIN/1.73M2
GLUCOSE SERPL-MCNC: 98 MG/DL (ref 70–99)
INR PPP: 1.03 (ref 0.85–1.15)
MAGNESIUM SERPL-MCNC: 2.1 MG/DL (ref 1.7–2.3)
POTASSIUM SERPL-SCNC: 4.4 MMOL/L (ref 3.4–5.3)
SODIUM SERPL-SCNC: 138 MMOL/L (ref 136–145)
UFH PPP CHRO-ACNC: <0.1 IU/ML

## 2022-07-28 PROCEDURE — 250N000013 HC RX MED GY IP 250 OP 250 PS 637: Performed by: STUDENT IN AN ORGANIZED HEALTH CARE EDUCATION/TRAINING PROGRAM

## 2022-07-28 PROCEDURE — 85520 HEPARIN ASSAY: CPT | Performed by: STUDENT IN AN ORGANIZED HEALTH CARE EDUCATION/TRAINING PROGRAM

## 2022-07-28 PROCEDURE — 250N000013 HC RX MED GY IP 250 OP 250 PS 637: Performed by: NURSE PRACTITIONER

## 2022-07-28 PROCEDURE — 250N000013 HC RX MED GY IP 250 OP 250 PS 637

## 2022-07-28 PROCEDURE — 99239 HOSP IP/OBS DSCHRG MGMT >30: CPT | Performed by: INTERNAL MEDICINE

## 2022-07-28 PROCEDURE — 83735 ASSAY OF MAGNESIUM: CPT | Performed by: NURSE PRACTITIONER

## 2022-07-28 PROCEDURE — 36415 COLL VENOUS BLD VENIPUNCTURE: CPT | Performed by: STUDENT IN AN ORGANIZED HEALTH CARE EDUCATION/TRAINING PROGRAM

## 2022-07-28 PROCEDURE — 82310 ASSAY OF CALCIUM: CPT

## 2022-07-28 PROCEDURE — 85610 PROTHROMBIN TIME: CPT

## 2022-07-28 RX ORDER — WARFARIN SODIUM 5 MG/1
5 TABLET ORAL
Status: DISCONTINUED | OUTPATIENT
Start: 2022-07-28 | End: 2022-07-28 | Stop reason: HOSPADM

## 2022-07-28 RX ORDER — CHLORTHALIDONE 25 MG/1
25 TABLET ORAL DAILY
Qty: 90 TABLET | Refills: 3 | Status: SHIPPED | OUTPATIENT
Start: 2022-07-29

## 2022-07-28 RX ORDER — CLOPIDOGREL BISULFATE 75 MG/1
75 TABLET ORAL DAILY
Qty: 90 TABLET | Refills: 3 | Status: SHIPPED | OUTPATIENT
Start: 2022-07-29

## 2022-07-28 RX ORDER — NITROGLYCERIN 0.4 MG/1
TABLET SUBLINGUAL
Qty: 90 TABLET | Refills: 0 | Status: SHIPPED | OUTPATIENT
Start: 2022-07-28

## 2022-07-28 RX ORDER — LOSARTAN POTASSIUM 50 MG/1
50 TABLET ORAL DAILY
Qty: 90 TABLET | Refills: 3 | Status: SHIPPED | OUTPATIENT
Start: 2022-07-29

## 2022-07-28 RX ORDER — FUROSEMIDE 40 MG
40 TABLET ORAL DAILY
Qty: 90 TABLET | Refills: 3 | Status: SHIPPED | OUTPATIENT
Start: 2022-07-29 | End: 2022-07-28

## 2022-07-28 RX ORDER — WARFARIN SODIUM 5 MG/1
5 TABLET ORAL DAILY
Qty: 90 TABLET | Refills: 0 | Status: SHIPPED | OUTPATIENT
Start: 2022-07-28

## 2022-07-28 RX ORDER — LOSARTAN POTASSIUM 50 MG/1
50 TABLET ORAL DAILY
Status: DISCONTINUED | OUTPATIENT
Start: 2022-07-28 | End: 2022-07-28 | Stop reason: HOSPADM

## 2022-07-28 RX ORDER — METOPROLOL SUCCINATE 25 MG/1
25 TABLET, EXTENDED RELEASE ORAL DAILY
Qty: 90 TABLET | Refills: 3 | Status: SHIPPED | OUTPATIENT
Start: 2022-07-29

## 2022-07-28 RX ORDER — ROSUVASTATIN CALCIUM 20 MG/1
20 TABLET, COATED ORAL AT BEDTIME
Qty: 90 TABLET | Refills: 3 | Status: SHIPPED | OUTPATIENT
Start: 2022-07-28

## 2022-07-28 RX ADMIN — LOSARTAN POTASSIUM 50 MG: 50 TABLET, FILM COATED ORAL at 07:59

## 2022-07-28 RX ADMIN — METOPROLOL SUCCINATE 25 MG: 25 TABLET, EXTENDED RELEASE ORAL at 07:59

## 2022-07-28 RX ADMIN — CLOPIDOGREL BISULFATE 75 MG: 75 TABLET ORAL at 07:59

## 2022-07-28 RX ADMIN — CHLORTHALIDONE 25 MG: 25 TABLET ORAL at 07:59

## 2022-07-28 RX ADMIN — FUROSEMIDE 40 MG: 40 TABLET ORAL at 07:59

## 2022-07-28 ASSESSMENT — ACTIVITIES OF DAILY LIVING (ADL)
ADLS_ACUITY_SCORE: 22

## 2022-07-28 NOTE — PROGRESS NOTES
Care Management Follow Up    Length of Stay (days): 3    Expected Discharge Date: 07/28/2022     Concerns to be Addressed:  Discharge planning. Anticoagulation Clinic. Primary care.        Anticipated Discharge Disposition:  Home     Anticipated Discharge Services:  Anticoagulation management.    Additional Information:  This AM informed by DISHA Brito 6C, there is a DISHA consult for INR management. RNCC will see pt instead.   12:30pm: I was reviewing pt's chart prior to going to see her and noted pt already discharged home. Called pt's nurse, Lexie. She reported pt planned to go to the hospital for lab draws and was going to set up her own primary doctor.  Left a voice message for pt requesting a call back about setting up her labs & Warfarin management.    Notified Silvina Colorado NP, pt discharged before I could meet with her and help arrange outpt INRs and primary care. ASHA Cool said pt was going to the [facility]. I inquired what Cardiologist pt was going to see. She said Cardiology will not be following the INRs. Pt is on Warfarin for antiphospholipid syndrome. She should be followed by primary care. Informed her I left a message for pt to call me.     5:45pm: Did not hear back from pt. Placed a Family Practice Referral in [facility] to establish primary care and anticoagulation management.       Mónica Chaudhry, RN Care Coordinator (6C RNCC coverage)  Deuce SAMPSON, [facility]

## 2022-07-28 NOTE — DISCHARGE SUMMARY
[address] [address]  p: [phone]    Discharge Summary: Cardiology Service    Amparo Bonilla MRN# [id number]   YOB: 1970 Age: 51 year old       Admission Date: 07/25/2022  Discharge Date: 07/28/2022    Discharge Diagnoses:  # NSTEMI  # Hypertensive Emergency  # ANDREA  # Antiphospholipid Syndrome  # Subtherapeutic INR  # Former Meth Abuse  # Hypokalemia, resolved    Brief HPI:  Amparo Bonilla is a 51 year old female who has a history of CAD s/p multiple prior stents (last angio in 2015 showed nonobstructive CAD with mild ISR), antiphospholipid syndrome (on Warfarin), HTN, meth use disorder, and recent NSTEMI on 7/8 (left AMA) admitted for NSTEMI and hypertensive emergency.    Hospital Course by Diagnosis:  # NSTEMI  # Hypertensive Emergency  Pt with prior NSTEMI, 7/8, left AMA prior to ischemic work up. Readmitted 7/26 with chest pain. Troponin elevated at 1,117 on admission. Peak 1,506, no down- trending.  Pt reports constant chest pain/heaviness throughout chest, not effected by activity.  Echocardiogram with normal function; EF 55-60%. LVH present. RA pressure 8 mmHg. Chest pain also likely worsened 2/2 HTN. SBP initially 254 on admission, briefly on Nitroglycerin gtt. Spoke to Interventional Cardiology about coronary angiogram, given history of medication non-compliance, decision made to medically treat NSTEMI with Plavix and anti-anginals for now. Patient to follow up with Cardiology as OP, if she remains compliant with medications and continues to have chest pain, can consider coronary angiogram as OP.     - Volume status: euvolemic, continue Lasix 40 mg daily   - DAPT: Warfarin/Plavix, no need for ASA triple therapy  - BB: Toprol 25 mg daily, if more BP control needed as OP, can consider changing to Coreg  - ACEi/ARB: Losartan 50 mg daily  - Continue Chlorthalidone 25 mg daily  - Statin: Continue Crestor 20 mg daily  - PRN  nitroglycerin tabs  - Cardiology follow up 2-3 weeks     # ANDREA  Likely related to over diuresis with IV Lasix, previous concern for hypervolemia, Cr worsened with diuresis. Possible Cr elevated in setting of HTN as well.   - Stop PO Lasix  - BMP 2 weeks to reassess Cr    # Antiphospholipid Syndrome  # Subtherapeutic INR  - On Warfarin PTA; pt reports was not taking  - Resume Warfarin  - INR today 1.0, no need for bridging  - Next INR Monday 8/1  - INR goal 2-3    # Former Meth abuse  No longer using      Discharge medications:   Current Discharge Medication List        START taking these medications    Details   chlorthalidone (HYGROTON) 25 MG tablet Take 1 tablet (25 mg) by mouth daily  Qty: 90 tablet, Refills: 3    Associated Diagnoses: NSTEMI (non-ST elevated myocardial infarction) (H); Hypertensive emergency      clopidogrel (PLAVIX) 75 MG tablet Take 1 tablet (75 mg) by mouth daily  Qty: 90 tablet, Refills: 3    Associated Diagnoses: NSTEMI (non-ST elevated myocardial infarction) (H); Hypertensive emergency      losartan (COZAAR) 50 MG tablet Take 1 tablet (50 mg) by mouth daily  Qty: 90 tablet, Refills: 3    Associated Diagnoses: NSTEMI (non-ST elevated myocardial infarction) (H); Hypertensive emergency      metoprolol succinate ER (TOPROL XL) 25 MG 24 hr tablet Take 1 tablet (25 mg) by mouth daily  Qty: 90 tablet, Refills: 3    Associated Diagnoses: NSTEMI (non-ST elevated myocardial infarction) (H); Hypertensive emergency      nitroGLYcerin (NITROSTAT) 0.4 MG sublingual tablet For chest pain place 1 tablet under the tongue every 5 minutes for 3 doses. If symptoms persist 5 minutes after 1st dose call 911.  Qty: 90 tablet, Refills: 0    Associated Diagnoses: NSTEMI (non-ST elevated myocardial infarction) (H); Hypertensive emergency      rosuvastatin (CRESTOR) 20 MG tablet Take 1 tablet (20 mg) by mouth At Bedtime  Qty: 90 tablet, Refills: 3    Associated Diagnoses: NSTEMI (non-ST elevated myocardial  infarction) (H); Hypertensive emergency      warfarin ANTICOAGULANT (COUMADIN) 5 MG tablet Take 1 tablet (5 mg) by mouth daily  Qty: 90 tablet, Refills: 0    Associated Diagnoses: NSTEMI (non-ST elevated myocardial infarction) (H); Hypertensive emergency           STOP taking these medications       aspirin 81 MG chewable tablet Comments:   Reason for Stopping:         GABAPENTIN PO Comments:   Reason for Stopping:         IBUPROFEN PO Comments:   Reason for Stopping:               Follow-up:  - PCP 7-10 days for post hospitalization visit  - Interventional Cardiology MD/ROSALIND 2-3 weeks for NSTEMI follow up    Labs or imaging requiring follow-up after discharge:  INR Monday, 8/1    Code status:  Full    Condition on discharge  Temp:  [97.6  F (36.4  C)-98.1  F (36.7  C)] 97.9  F (36.6  C)  Pulse:  [54-75] 58  Resp:  [16] 16  BP: (137-202)/() 159/102  SpO2:  [100 %] 100 %  General: Alert, interactive, NAD  Eyes: sclera anicteric, EOMI  Neck: no JVD, carotid 2+ bilaterally  Cardiovascular: regular rate and rhythm, normal S1 and S2, no murmurs, gallops, or rubs  Resp: clear to auscultation bilaterally, no rales, wheezes, or rhonchi  GI: Soft, nontender, nondistended. +BS.  No HSM or masses, no rebound or guarding.  Extremities: no edema, no cyanosis or clubbing, dorsalis pedis and posterior tibialis pulses 2+ bilaterally  Skin: Warm and dry, no jaundice or rash  Neuro: CN 2-12 intact, moves all extremities equally  Psych: Alert & oriented x 3    Imaging with results:  Echocardiogram 7/28/2022:  Interpretation Summary  Global and regional left ventricular function is normal with an EF of 55-60%.  Moderate concentric wall thickening consistent with left ventricular  hypertrophy is present.  Global right ventricular function is normal.  No significant valvular abnormalities present.  IVC diameter and respiratory changes fall into an intermediate range  suggesting an RA pressure of 8 mmHg.  No pericardial effusion is  present.    Other imaging studies:  EKG 12 Lead 7/28/2022: NSR HR 96      Patient Care Team:  No Ref-Primary, Physician as PCP - General    Silvina LEE CNP  [facility]    Time Spent on this Encounter   I, Mary Colorado CNP, personally saw the patient today and spent greater than 30 minutes discharging this patient.     Attending Attestation: I saw and evaluated the patient for the discharge. I agree with the findings and plan of care documented in the resident's fellow's note and summarized our shafer findings with the patient. I spent more than 30 minutes to coordinate and direct this patient's discharge.

## 2022-07-28 NOTE — PROGRESS NOTES
"NEURO: A&O x4; able to make needs known   RESPIRATORY: Room air; sats >94%  CARDIAC: SB/SR; rates 40-60s; BPs stable   GI/: Voiding spontaneously; no BM this shift   SKIN: No overt deficits noted   PAIN: Denies   TESTS/PROCEDURES: None  MOBILITY: Independent; up ad christina   DIET: Regular    LDAs: PIV x1     PLAN: Plan for discharge today; Continue POC; notify the primary team with any concerns/updates.       BP (!) 147/91 (BP Location: Left arm)   Pulse 54   Temp 97.9  F (36.6  C) (Oral)   Resp 16   Ht 1.575 m (5' 2\")   Wt 57.6 kg (127 lb)   SpO2 100%   BMI 23.23 kg/m            "

## 2022-07-28 NOTE — DISCHARGE SUMMARY
DISCHARGE     Discharged to: Home  Via: Automobile  Accompanied by: Self  Discharge Instructions: diet, activity, medications, follow up appointments, when to call the MD, and what to watchout for (i.e. s/s of infection, increasing SOB, palpitations, chest pain, hypertension control, taking medications)  Prescriptions: To be filled by [facility] pharmacy per pt's request; medication list reviewed & sent with pt  Follow Up Appointments: arranged; information given  Belongings: All sent with pt  IV: out  Telemetry: off  Pt exhibits understanding of above discharge instructions; all questions answered.  Discharge Paperwork: Copy given to patient

## 2022-07-28 NOTE — PLAN OF CARE
D: Admitted s/p NSTEMI and hypertensive emergency  PMH of CAD s/p multiple stents, antiphospholipid syndrome, HTN, meth use disorder, recent NSTEMI     I: Monitored vitals and assessed pt status.      A: A0x4. Afebrile. VSS. BPs elevated. MD notified and aware. Pt currently on several BP meds.  HRs 50-70s. Sinus rhythm. Sats >92% on RA. Pt denies any chest pain/palpitations, nausea, dizziness, or chills. No skin issues. Pt on regular diet. Pt up independently.      P: Continue to monitor pt status and notify Cards 2 treatment team with any changes or concerns.

## 2022-07-29 ENCOUNTER — PATIENT OUTREACH (OUTPATIENT)
Dept: CARE COORDINATION | Facility: CLINIC | Age: 52
End: 2022-07-29

## 2022-07-29 DIAGNOSIS — Z71.89 OTHER SPECIFIED COUNSELING: ICD-10-CM

## 2022-07-29 NOTE — PROGRESS NOTES
Clinic Care Coordination Contact  [facility]/Voicemail       Clinical Data: Care Coordinator Outreach  Outreach attempted x 1.  Left message on patient's voicemail with call back information and requested return call.  Plan: Care Coordinator will try to reach patient again in 1-2 business days.        ARON German  [phone]  [facility]

## 2022-07-30 NOTE — PROGRESS NOTES
Clinic Care Coordination Contact  [facility]/Voicemail       Clinical Data: Care Coordinator Outreach    Outreach attempted x 2.  Left message on patient's voicemail with call back information and requested return call.    Plan:  Care Coordinator will do no further outreaches at this time.    Gina Swanson  [facility] Care Resource [facility]

## 2022-11-19 ENCOUNTER — HEALTH MAINTENANCE LETTER (OUTPATIENT)
Age: 52
End: 2022-11-19

## 2023-01-01 ENCOUNTER — HEALTH MAINTENANCE LETTER (OUTPATIENT)
Age: 53
End: 2023-01-01